# Patient Record
Sex: FEMALE | Race: WHITE | NOT HISPANIC OR LATINO | Employment: PART TIME | URBAN - METROPOLITAN AREA
[De-identification: names, ages, dates, MRNs, and addresses within clinical notes are randomized per-mention and may not be internally consistent; named-entity substitution may affect disease eponyms.]

---

## 2017-02-15 ENCOUNTER — TRANSFERRED RECORDS (OUTPATIENT)
Dept: HEALTH INFORMATION MANAGEMENT | Facility: CLINIC | Age: 57
End: 2017-02-15

## 2017-02-20 ENCOUNTER — COMMUNICATION - HEALTHEAST (OUTPATIENT)
Dept: PALLIATIVE MEDICINE | Facility: OTHER | Age: 57
End: 2017-02-20

## 2017-02-21 ENCOUNTER — HOSPITAL ENCOUNTER (OUTPATIENT)
Dept: PALLIATIVE MEDICINE | Facility: OTHER | Age: 57
Discharge: HOME | End: 2017-02-21
Attending: PAIN MEDICINE

## 2017-02-21 DIAGNOSIS — M62.838 MUSCLE SPASM: ICD-10-CM

## 2017-02-21 DIAGNOSIS — G43.909 MIGRAINES: ICD-10-CM

## 2017-02-21 ASSESSMENT — MIFFLIN-ST. JEOR: SCORE: 1038.09

## 2017-02-22 ENCOUNTER — COMMUNICATION - HEALTHEAST (OUTPATIENT)
Dept: PALLIATIVE MEDICINE | Facility: OTHER | Age: 57
End: 2017-02-22

## 2017-02-22 DIAGNOSIS — R07.9 CHEST PAIN, UNSPECIFIED TYPE: Primary | ICD-10-CM

## 2017-03-23 ENCOUNTER — HOSPITAL ENCOUNTER (OUTPATIENT)
Dept: CARDIOLOGY | Facility: CLINIC | Age: 57
Discharge: HOME | End: 2017-03-23
Attending: FAMILY MEDICINE | Admitting: FAMILY MEDICINE
Payer: COMMERCIAL

## 2017-03-23 DIAGNOSIS — R07.9 CHEST PAIN, UNSPECIFIED TYPE: ICD-10-CM

## 2017-03-23 PROCEDURE — 93350 STRESS TTE ONLY: CPT | Mod: 26 | Performed by: INTERNAL MEDICINE

## 2017-03-23 PROCEDURE — 93016 CV STRESS TEST SUPVJ ONLY: CPT | Performed by: INTERNAL MEDICINE

## 2017-03-23 PROCEDURE — 40000264 ECHO STRESS WITH OPTISON

## 2017-03-23 PROCEDURE — 93325 DOPPLER ECHO COLOR FLOW MAPG: CPT | Mod: 26 | Performed by: INTERNAL MEDICINE

## 2017-03-23 PROCEDURE — 93321 DOPPLER ECHO F-UP/LMTD STD: CPT | Mod: 26 | Performed by: INTERNAL MEDICINE

## 2017-03-23 PROCEDURE — 93018 CV STRESS TEST I&R ONLY: CPT | Performed by: INTERNAL MEDICINE

## 2017-03-23 PROCEDURE — 25500064 ZZH RX 255 OP 636: Performed by: FAMILY MEDICINE

## 2017-03-23 RX ADMIN — HUMAN ALBUMIN MICROSPHERES AND PERFLUTREN 2 ML: 10; .22 INJECTION, SOLUTION INTRAVENOUS at 09:13

## 2017-05-22 ENCOUNTER — COMMUNICATION - HEALTHEAST (OUTPATIENT)
Dept: PALLIATIVE MEDICINE | Facility: OTHER | Age: 57
End: 2017-05-22

## 2017-05-23 ENCOUNTER — HOSPITAL ENCOUNTER (OUTPATIENT)
Dept: PALLIATIVE MEDICINE | Facility: OTHER | Age: 57
Discharge: HOME | End: 2017-05-23
Attending: PAIN MEDICINE

## 2017-05-23 DIAGNOSIS — M79.18 MYOFASCIAL PAIN: ICD-10-CM

## 2017-05-24 ENCOUNTER — COMMUNICATION - HEALTHEAST (OUTPATIENT)
Dept: PALLIATIVE MEDICINE | Facility: OTHER | Age: 57
End: 2017-05-24

## 2017-09-07 ENCOUNTER — HOSPITAL ENCOUNTER (OUTPATIENT)
Dept: MAMMOGRAPHY | Facility: HOSPITAL | Age: 57
Discharge: HOME | End: 2017-09-07
Attending: FAMILY MEDICINE

## 2017-09-07 DIAGNOSIS — Z12.31 VISIT FOR SCREENING MAMMOGRAM: ICD-10-CM

## 2017-09-25 ENCOUNTER — COMMUNICATION - HEALTHEAST (OUTPATIENT)
Dept: PALLIATIVE MEDICINE | Facility: OTHER | Age: 57
End: 2017-09-25

## 2017-09-26 ENCOUNTER — HOSPITAL ENCOUNTER (OUTPATIENT)
Dept: PALLIATIVE MEDICINE | Facility: OTHER | Age: 57
Discharge: HOME | End: 2017-09-26
Attending: PAIN MEDICINE

## 2017-09-26 DIAGNOSIS — M79.18 MYOFASCIAL PAIN: ICD-10-CM

## 2017-09-26 ASSESSMENT — MIFFLIN-ST. JEOR: SCORE: 1038.09

## 2017-09-27 ENCOUNTER — COMMUNICATION - HEALTHEAST (OUTPATIENT)
Dept: PALLIATIVE MEDICINE | Facility: OTHER | Age: 57
End: 2017-09-27

## 2017-11-26 ENCOUNTER — HEALTH MAINTENANCE LETTER (OUTPATIENT)
Age: 57
End: 2017-11-26

## 2018-04-24 ENCOUNTER — COMMUNICATION - HEALTHEAST (OUTPATIENT)
Dept: PALLIATIVE MEDICINE | Facility: OTHER | Age: 58
End: 2018-04-24

## 2018-04-25 ENCOUNTER — HOSPITAL ENCOUNTER (OUTPATIENT)
Dept: PALLIATIVE MEDICINE | Facility: OTHER | Age: 58
Discharge: HOME | End: 2018-04-25
Attending: PAIN MEDICINE | Admitting: PAIN MEDICINE

## 2018-04-25 DIAGNOSIS — G43.711 HEADACHE, CHRONIC MIGRAINE WITHOUT AURA, INTRACTABLE, WITH STATUS: ICD-10-CM

## 2018-04-25 ASSESSMENT — MIFFLIN-ST. JEOR: SCORE: 1056.23

## 2018-04-26 ENCOUNTER — COMMUNICATION - HEALTHEAST (OUTPATIENT)
Dept: PALLIATIVE MEDICINE | Facility: OTHER | Age: 58
End: 2018-04-26

## 2018-04-28 ENCOUNTER — HOSPITAL ENCOUNTER (EMERGENCY)
Facility: CLINIC | Age: 58
Discharge: HOME | End: 2018-04-28
Attending: FAMILY MEDICINE | Admitting: FAMILY MEDICINE
Payer: COMMERCIAL

## 2018-04-28 VITALS
WEIGHT: 114 LBS | HEART RATE: 82 BPM | BODY MASS INDEX: 20.2 KG/M2 | TEMPERATURE: 99.4 F | HEIGHT: 63 IN | OXYGEN SATURATION: 98 % | DIASTOLIC BLOOD PRESSURE: 66 MMHG | SYSTOLIC BLOOD PRESSURE: 102 MMHG | RESPIRATION RATE: 18 BRPM

## 2018-04-28 DIAGNOSIS — G43.809 OTHER MIGRAINE WITHOUT STATUS MIGRAINOSUS, NOT INTRACTABLE: ICD-10-CM

## 2018-04-28 PROCEDURE — 96365 THER/PROPH/DIAG IV INF INIT: CPT | Performed by: FAMILY MEDICINE

## 2018-04-28 PROCEDURE — 96375 TX/PRO/DX INJ NEW DRUG ADDON: CPT | Performed by: FAMILY MEDICINE

## 2018-04-28 PROCEDURE — 99284 EMERGENCY DEPT VISIT MOD MDM: CPT | Mod: Z6 | Performed by: FAMILY MEDICINE

## 2018-04-28 PROCEDURE — 99284 EMERGENCY DEPT VISIT MOD MDM: CPT | Mod: 25 | Performed by: FAMILY MEDICINE

## 2018-04-28 PROCEDURE — 25000128 H RX IP 250 OP 636: Performed by: FAMILY MEDICINE

## 2018-04-28 PROCEDURE — 96361 HYDRATE IV INFUSION ADD-ON: CPT | Performed by: FAMILY MEDICINE

## 2018-04-28 RX ORDER — KETOROLAC TROMETHAMINE 30 MG/ML
15 INJECTION, SOLUTION INTRAMUSCULAR; INTRAVENOUS ONCE
Status: COMPLETED | OUTPATIENT
Start: 2018-04-28 | End: 2018-04-28

## 2018-04-28 RX ORDER — ONDANSETRON 2 MG/ML
4 INJECTION INTRAMUSCULAR; INTRAVENOUS EVERY 30 MIN PRN
Status: DISCONTINUED | OUTPATIENT
Start: 2018-04-28 | End: 2018-04-28 | Stop reason: HOSPADM

## 2018-04-28 RX ORDER — DIPHENHYDRAMINE HYDROCHLORIDE 50 MG/ML
50 INJECTION INTRAMUSCULAR; INTRAVENOUS ONCE
Status: COMPLETED | OUTPATIENT
Start: 2018-04-28 | End: 2018-04-28

## 2018-04-28 RX ORDER — METOCLOPRAMIDE 5 MG/1
5-10 TABLET ORAL
Qty: 10 TABLET | Refills: 0 | Status: SHIPPED | OUTPATIENT
Start: 2018-04-28

## 2018-04-28 RX ORDER — SODIUM CHLORIDE 9 MG/ML
1000 INJECTION, SOLUTION INTRAVENOUS CONTINUOUS
Status: DISCONTINUED | OUTPATIENT
Start: 2018-04-28 | End: 2018-04-28 | Stop reason: HOSPADM

## 2018-04-28 RX ORDER — MAGNESIUM SULFATE HEPTAHYDRATE 500 MG/ML
1 INJECTION, SOLUTION INTRAMUSCULAR; INTRAVENOUS ONCE
Status: DISCONTINUED | OUTPATIENT
Start: 2018-04-28 | End: 2018-04-28

## 2018-04-28 RX ORDER — MAGNESIUM SULFATE 1 G/100ML
1 INJECTION INTRAVENOUS ONCE
Status: COMPLETED | OUTPATIENT
Start: 2018-04-28 | End: 2018-04-28

## 2018-04-28 RX ADMIN — ONDANSETRON 4 MG: 2 INJECTION, SOLUTION INTRAMUSCULAR; INTRAVENOUS at 14:53

## 2018-04-28 RX ADMIN — PROCHLORPERAZINE EDISYLATE 10 MG: 5 INJECTION INTRAMUSCULAR; INTRAVENOUS at 15:16

## 2018-04-28 RX ADMIN — KETOROLAC TROMETHAMINE 15 MG: 30 INJECTION, SOLUTION INTRAMUSCULAR at 16:31

## 2018-04-28 RX ADMIN — DIPHENHYDRAMINE HYDROCHLORIDE 50 MG: 50 INJECTION, SOLUTION INTRAMUSCULAR; INTRAVENOUS at 15:14

## 2018-04-28 RX ADMIN — SODIUM CHLORIDE 1000 ML: 9 INJECTION, SOLUTION INTRAVENOUS at 15:58

## 2018-04-28 RX ADMIN — SODIUM CHLORIDE 1000 ML: 9 INJECTION, SOLUTION INTRAVENOUS at 14:46

## 2018-04-28 RX ADMIN — MAGNESIUM SULFATE IN DEXTROSE 1 G: 10 INJECTION, SOLUTION INTRAVENOUS at 16:37

## 2018-04-28 ASSESSMENT — ENCOUNTER SYMPTOMS
ABDOMINAL PAIN: 0
SINUS PRESSURE: 0
HEADACHES: 1
SORE THROAT: 0
WHEEZING: 0
PALPITATIONS: 0
PHOTOPHOBIA: 1
COUGH: 0
NAUSEA: 1
BLOOD IN STOOL: 0
FEVER: 0
VOMITING: 1
CONSTIPATION: 0
DIARRHEA: 0
SHORTNESS OF BREATH: 0
DIAPHORESIS: 0
CHILLS: 0
DYSURIA: 0
FREQUENCY: 0

## 2018-04-28 NOTE — DISCHARGE INSTRUCTIONS
ICD-10-CM    1. Other migraine without status migrainosus, not intractable G43.809     resume home medications.  Avoid migraine triggers.  Follow-up clinic. consider migraine prophylaxis such as amitriptyline, topomax, propranolol,.  consider reglan with ibuprofen instead of fioricet.   reglan can cause dystonic reactions - if this was to occur, take benadryl         * Migraine Headache  Migraine headaches are related to changes in blood flow to the brain. This causes throbbing or constant pain on one or both sides of the head. The pain may last from a few hours to several days. There is usually nausea, vomiting, sensitivity to light and sound, and blurred vision. A migraine attack may be triggered by emotional stress, hormone changes during the menstrual cycle, oral contraceptives, alcohol use, certain foods containing tyramine, eye strain, weather changes, missing meals, or too little or too much sleep.  Home Care For This Headache:  1) If you were given pain medicine for this headache, do not drive yourself home . Arrange for a ride, instead. When you get home, try to sleep. You should feel much better when you wake up.  2) Migraine headaches may improve with an ice pack on the forehead or at the base of the skull. Heat to the back of your neck may relieve any neck spasm.  3) Drink only clear liquids or eat a very light diet to avoid nausea/vomiting until symptoms improve.  Preventing Future Headaches:  1) Pay attention to those factors that seem to trigger your headache. Try to avoid them when you can. If you have frequent headaches, it is useful to keep a diary of what you were doing, feeling or eating in the hours before each attack. Show this to your doctor to help find the cause of your headaches.  a) If you feel that stress is a factor in your headaches, look at the sources of stress in your life. Find ways to release the build-up of those stresses by using regular exercise, relaxation methods (yoga,  meditation), bio-feedback or simply taking time-out for yourself. For more information about this, consult your doctor or go to a local bookstore and review books and tapes on this subject.  b) Tyramine is a substance present in the following foods : chocolate, yogurt, all cheeses except cottage cheese and cream cheese. smoked or pickled fish and meat (including herring, caviar, bologna, pepperoni, salami), liver, avocados, bananas, figs, raisins, and red wine. Be aware that these foods may trigger a migraine in some persons. Try taking these foods out of your diet for 1-2 months to see if this reduces headache frequency.  Treating Future Attacks:  1) At the first sign of a migraine headache, take a medicine to stop it if one has been prescribed for you. If not, take acetaminophen (Tylenol) or ibuprofen (Motrin, Advil) if you are able to take these. The sooner you take medicine, the better it will work.  2) You may also want to find a quiet, dark, comfortable place to sit or lie down. Let yourself relax or sleep.  3) An ice pack on the forehead or area of greatest pain may also help.   Follow Up  with your doctor if the headache is not better within the next 24 hours. If you have frequent headaches you should discuss a treatment plan with your primary care doctor. Ask if you can have medicine to take at home the next time you get a bad headache. Poorly controlled chronic headaches may require a referral to a neurologist (headache specialist).  Get Prompt Medical Attention  if any of the following occur:    Your head pain gets worse, or does not improve within 24 hours    Repeated vomiting (can t keep liquids down)    Sinus or ear or throat pain (not already reported)    Fever of 101  F (38.3  C) or higher, or as directed by your healthcare provider    Stiff neck    Extreme drowsiness, confusion or fainting    Weakness of an arm or leg or one side of the face    Difficulty with speech or vision    2627-2067 [employer]. [address] [place] [address], [place] [address]. All rights reserved. This information is not intended as a substitute for professional medical care. Always follow your healthcare professional's instructions.  This information has been modified by your health care provider with permission from the publisher.

## 2018-04-28 NOTE — ED AVS SNAPSHOT
[facility] Emergency Department    [address] [facility] [address]    Phone:  [phone]    Fax:  [phone]                                       Jessika Hunt   MRN: [id number]    Department:  [facility] Emergency Department   Date of Visit:  4/28/2018           Patient Information     Date Of Birth          1960        Your diagnoses for this visit were:     Other migraine without status migrainosus, not intractable resume home medications.  Avoid migraine triggers.  Follow-up clinic. consider migraine prophylaxis such as amitriptyline, topomax, propranolol,.  consider reglan with ibuprofen instead of fioricet.   reglan can cause dystonic reactions - if this was to occur, take benadryl       You were seen by Kit Washington MD.      Follow-up Information     Follow up with Melody Rubalcava MD In 1 week.    Specialty:  Family Practice    Contact information:    [address] [address]  [phone]          Follow up with [facility] Emergency Department.    Specialty:  EMERGENCY MEDICINE    Why:  As needed, If symptoms worsen    Contact information:    [address] [place] [id number] [phone]    Additional information:    The medical center is located at   [address] [place] [address]. (between [address] and   [address] in [place], [place] north   of [place]).        Discharge Instructions         ICD-10-CM    1. Other migraine without status migrainosus, not intractable G43.809     resume home medications.  Avoid migraine triggers.  Follow-up clinic. consider migraine prophylaxis such as amitriptyline, topomax, propranolol,.  consider reglan with ibuprofen instead of fioricet.   reglan can cause dystonic reactions - if this was to occur, take benadryl         * Migraine Headache  Migraine headaches are related to changes in blood flow to the brain. This causes throbbing or constant pain on one or both sides of the head.  The pain may last from a few hours to several days. There is usually nausea, vomiting, sensitivity to light and sound, and blurred vision. A migraine attack may be triggered by emotional stress, hormone changes during the menstrual cycle, oral contraceptives, alcohol use, certain foods containing tyramine, eye strain, weather changes, missing meals, or too little or too much sleep.  Home Care For This Headache:  1) If you were given pain medicine for this headache, do not drive yourself home . Arrange for a ride, instead. When you get home, try to sleep. You should feel much better when you wake up.  2) Migraine headaches may improve with an ice pack on the forehead or at the base of the skull. Heat to the back of your neck may relieve any neck spasm.  3) Drink only clear liquids or eat a very light diet to avoid nausea/vomiting until symptoms improve.  Preventing Future Headaches:  1) Pay attention to those factors that seem to trigger your headache. Try to avoid them when you can. If you have frequent headaches, it is useful to keep a diary of what you were doing, feeling or eating in the hours before each attack. Show this to your doctor to help find the cause of your headaches.  a) If you feel that stress is a factor in your headaches, look at the sources of stress in your life. Find ways to release the build-up of those stresses by using regular exercise, relaxation methods (yoga, meditation), bio-feedback or simply taking time-out for yourself. For more information about this, consult your doctor or go to a local bookstore and review books and tapes on this subject.  b) Tyramine is a substance present in the following foods : chocolate, yogurt, all cheeses except cottage cheese and cream cheese. smoked or pickled fish and meat (including herring, caviar, bologna, pepperoni, salami), liver, avocados, bananas, figs, raisins, and red wine. Be aware that these foods may trigger a migraine in some persons. Try  taking these foods out of your diet for 1-2 months to see if this reduces headache frequency.  Treating Future Attacks:  1) At the first sign of a migraine headache, take a medicine to stop it if one has been prescribed for you. If not, take acetaminophen (Tylenol) or ibuprofen (Motrin, Advil) if you are able to take these. The sooner you take medicine, the better it will work.  2) You may also want to find a quiet, dark, comfortable place to sit or lie down. Let yourself relax or sleep.  3) An ice pack on the forehead or area of greatest pain may also help.   Follow Up  with your doctor if the headache is not better within the next 24 hours. If you have frequent headaches you should discuss a treatment plan with your primary care doctor. Ask if you can have medicine to take at home the next time you get a bad headache. Poorly controlled chronic headaches may require a referral to a neurologist (headache specialist).  Get Prompt Medical Attention  if any of the following occur:    Your head pain gets worse, or does not improve within 24 hours    Repeated vomiting (can t keep liquids down)    Sinus or ear or throat pain (not already reported)    Fever of 101  F (38.3  C) or higher, or as directed by your healthcare provider    Stiff neck    Extreme drowsiness, confusion or fainting    Weakness of an arm or leg or one side of the face    Difficulty with speech or vision    2568-4775 [employer]. [address] [place] [address]. All rights reserved. This information is not intended as a substitute for professional medical care. Always follow your healthcare professional's instructions.  This information has been modified by your health care provider with permission from the publisher.          24 Hour Appointment Hotline       To make an appointment at any [facility] clinic, call [phone] ([phone]). If you don't have a family doctor or clinic, we will help you find one. [facility]  clin[facility] are conveniently located to serve the needs of you and your family.             Review of your medicines      START taking        Dose / Directions Last dose taken    metoclopramide 5 MG tablet   Commonly known as:  REGLAN   Dose:  5-10 mg   Quantity:  10 tablet        Take 1-2 tablets (5-10 mg) by mouth once as needed (migraine - taken with excedrin or ibuprofen)   Refills:  0          Our records show that you are taking the medicines listed below. If these are incorrect, please call your family doctor or clinic.        Dose / Directions Last dose taken    BUTALBITAL-APAP-CAFF-COD PO   Dose:  1 tablet        Take 1 tablet by mouth at onset of headache   Refills:  0        IBUPROFEN PO   Dose:  800 mg        Take 800 mg by mouth at onset of headache   Refills:  0        PROZAC PO   Dose:  20 mg        Take 20 mg by mouth daily   Refills:  0        RELPAX PO   Dose:  40 mg        Take 40 mg by mouth at onset of headache   Refills:  0                Prescriptions were sent or printed at these locations (1 Prescription)                   [facility] [address] [place] [address] AT [address]   [address] [facility] [address]    Telephone:  [phone]   Fax:  [phone]   Hours:                  E-Prescribed (1 of 1)         metoclopramide (REGLAN) 5 MG tablet                Orders Needing Specimen Collection     None      Pending Results     No orders found from 4/26/2018 to 4/29/2018.            Pending Culture Results     No orders found from 4/26/2018 to 4/29/2018.            Pending Results Instructions     If you had any lab results that were not finalized at the time of your Discharge, you can call the ED Lab Result RN at [phone]. You will be contacted by this team for any positive Lab results or changes in treatment. The nurses are available 7 days a week from 10A to 6:30P.  You can leave a message 24 hours per day and they will return your  call.        Test Results From Your Hospital Stay               Thank you for choosing [facility]       Thank you for choosing [facility] for your care. Our goal is always to provide you with excellent care. Hearing back from our patients is one way we can continue to improve our services. Please take a few minutes to complete the written survey that you may receive in the mail after you visit with us. Thank you!        [employer] Information     [employer] gives you secure access to your electronic health record. If you see a primary care provider, you can also send messages to your care team and make appointments. If you have questions, please call your primary care clinic.  If you do not have a primary care provider, please call [phone] and they will assist you.        Care EveryWhere ID     This is your Care EveryWhere ID. This could be used by other organizations to access your [facility] medical records  [id number]        Equal Access to Services     BETZY GOLD : Wilfredo Edouard, gisela carmen, montse mcdowell. So [facility] [phone].    ATENCIÓN: Si habla español, tiene a stallings disposición servicios gratuitos de asistencia lingüística. Llame al [phone].    We comply with applicable federal civil rights laws and [place] laws. We do not discriminate on the basis of race, color, national origin, age, disability, sex, sexual orientation, or gender identity.            After Visit Summary       This is your record. Keep this with you and show to your community pharmacist(s) and doctor(s) at your next visit.

## 2018-04-28 NOTE — ED PROVIDER NOTES
History     Chief Complaint   Patient presents with     Headache     migraine     HPI  Jessika Hunt is a 58 year old female who presented with migraine with aura - onset 1 am =- fell asleep awoke throbbing headache and both occipital and frontal.  non-thunderclap.  no fever.  no head injury.  No seizure history. light sensitivity,. nausea, vomiting.   imitrex or relpax - abortive migraine meds.  No migraine prophylax.  at least 2 migraines per month.   botox injections last wednesday in pain clinic  uses fioricet occl  nasal congestion.      took ibuprofen 800 mg at noon  PMH  Migraines    occl etoh  no tobacco    menopausal     Problem List:    There are no active problems to display for this patient.       Past Medical History:    No past medical history on file.    Past Surgical History:    No past surgical history on file.    Family History:    No family history on file.    Social History:  Marital Status:   [2]  Social History   Substance Use Topics     Smoking status: Never Smoker     Smokeless tobacco: Never Used     Alcohol use Yes        Medications:      BUTALBITAL-APAP-CAFF-COD PO   Eletriptan Hydrobromide (RELPAX PO)   IBUPROFEN PO         Review of Systems   Constitutional: Negative for chills, diaphoresis and fever.   HENT: Positive for congestion. Negative for ear pain, sinus pressure and sore throat.    Eyes: Positive for photophobia. Negative for visual disturbance.   Respiratory: Negative for cough, shortness of breath and wheezing.    Cardiovascular: Negative for chest pain and palpitations.   Gastrointestinal: Positive for nausea and vomiting. Negative for abdominal pain, blood in stool, constipation and diarrhea.   Genitourinary: Negative for dysuria, frequency and urgency.   Skin: Negative for rash.   Neurological: Positive for headaches.   All other systems reviewed and are negative.      Physical Exam   BP: 107/68  Pulse: 82  Temp: 99.4  F (37.4  C)  Resp: 18  Height: 160 cm (5'  "3\")  Weight: 51.7 kg (114 lb)  SpO2: 99 %      Physical Exam   Constitutional: She is oriented to person, place, and time. She appears distressed.   HENT:   Mouth/Throat: Oropharynx is clear and moist.   Eyes: Conjunctivae and EOM are normal. Pupils are equal, round, and reactive to light.   Neck: Neck supple.   Cardiovascular: Exam reveals no gallop and no friction rub.    No murmur heard.  Pulmonary/Chest: Effort normal and breath sounds normal. No respiratory distress. She has no wheezes. She has no rales.   Abdominal: Soft. Bowel sounds are normal. She exhibits no distension. There is no tenderness. There is no rebound.   Musculoskeletal: She exhibits no edema.   Neurological: She is alert and oriented to person, place, and time. No cranial nerve deficit. She exhibits normal muscle tone. Coordination normal.   Skin: No rash noted. She is not diaphoretic.       ED Course     ED Course     Procedures               Critical Care time:  none                   Medications   0.9% sodium chloride BOLUS (0 mLs Intravenous Stopped 4/28/18 1557)   diphenhydrAMINE (BENADRYL) injection 50 mg (50 mg Intravenous Given 4/28/18 1514)   prochlorperazine (COMPAZINE) injection 10 mg (10 mg Intravenous Given 4/28/18 1516)   ketorolac (TORADOL) injection 15 mg (15 mg Intravenous Given 4/28/18 1631)   magnesium sulfate 1 gm in 100mL D5W intermittent infusion (0 g Intravenous Stopped 4/28/18 1731)       Assessments & Plan (with Medical Decision Making)     MDM: Jessika Hunt is a 58 year old female who presented with migraine history and without red flag symptoms.  This headache came on at 1 AM.  And had persisted since.  She had a normal neurologic exam.  Moderate distress.  Lasix symptoms for her migraines.  She was given management as above initially with Benadryl Compazine and fluids and then when had incomplete relief was given Toradol as of now been 4-5 hours since the last ibuprofen dose and she also been given magnesium " sulfate.  Marked improvement.  Discharged home with precautions for return.  We also discussed migraine prophylaxis as noted below and asked her to discuss this further with her primary provider Dr. Rubalcava.    I have reviewed the nursing notes.    I have reviewed the findings, diagnosis, plan and need for follow up with the patient.       New Prescriptions    No medications on file       Final diagnoses:   Other migraine without status migrainosus, not intractable - resume home medications.  Avoid migraine triggers.  Follow-up clinic. consider migraine prophylaxis such as amitriptyline, topomax, propranolol,.  consider reglan with ibuprofen instead of fioricet.   reglan can cause dystonic reactions - if this was to occur, take benadryl       4/28/2018   [facility] EMERGENCY DEPARTMENT     Kit Washington MD  04/28/18 2014

## 2018-04-28 NOTE — ED NOTES
Hx of migraines.   Pt started having migraine 3074-1504 today.  Pt reports taking ibuprofen, benadryl, sudafed and relpax but unable to keep medications down.   Pt vomited 30 minutes ago.   Migraine throughout forehead,eyes and radiates bilateral neck to base of posterior neck.

## 2018-04-28 NOTE — ED AVS SNAPSHOT
[facility] Emergency Department    [address] [facility] [address]    Phone:  [phone]    Fax:  [phone]                                       Jessika Hunt   MRN: [id number]    Department:  [facility] Emergency Department   Date of Visit:  4/28/2018           After Visit Summary Signature Page     I have received my discharge instructions, and my questions have been answered. I have discussed any challenges I see with this plan with the nurse or doctor.    ..........................................................................................................................................  Patient/Patient Representative Signature      ..........................................................................................................................................  Patient Representative Print Name and Relationship to Patient    ..................................................               ................................................  Date                                            Time    ..........................................................................................................................................  Reviewed by Signature/Title    ...................................................              ..............................................  Date                                                            Time

## 2018-06-13 ENCOUNTER — HOSPITAL ENCOUNTER (OUTPATIENT)
Dept: PALLIATIVE MEDICINE | Facility: OTHER | Age: 58
Discharge: HOME | End: 2018-06-13
Attending: PAIN MEDICINE | Admitting: PAIN MEDICINE

## 2018-06-13 DIAGNOSIS — M79.18 MYOFASCIAL PAIN: ICD-10-CM

## 2018-06-14 ENCOUNTER — COMMUNICATION - HEALTHEAST (OUTPATIENT)
Dept: PALLIATIVE MEDICINE | Facility: OTHER | Age: 58
End: 2018-06-14

## 2018-10-30 ENCOUNTER — COMMUNICATION - HEALTHEAST (OUTPATIENT)
Dept: PALLIATIVE MEDICINE | Facility: OTHER | Age: 58
End: 2018-10-30

## 2018-10-31 ENCOUNTER — RECORDS - HEALTHEAST (OUTPATIENT)
Dept: ADMINISTRATIVE | Facility: OTHER | Age: 58
End: 2018-10-31

## 2018-10-31 ENCOUNTER — HOSPITAL ENCOUNTER (OUTPATIENT)
Dept: PALLIATIVE MEDICINE | Facility: OTHER | Age: 58
Discharge: HOME | End: 2018-10-31
Attending: PAIN MEDICINE | Admitting: PAIN MEDICINE

## 2018-10-31 DIAGNOSIS — G43.909 MIGRAINE: ICD-10-CM

## 2018-10-31 DIAGNOSIS — G43.019 INTRACTABLE MIGRAINE WITHOUT AURA AND WITHOUT STATUS MIGRAINOSUS: ICD-10-CM

## 2018-10-31 ASSESSMENT — MIFFLIN-ST. JEOR: SCORE: 1056.23

## 2018-11-01 ENCOUNTER — COMMUNICATION - HEALTHEAST (OUTPATIENT)
Dept: PALLIATIVE MEDICINE | Facility: OTHER | Age: 58
End: 2018-11-01

## 2019-06-04 ENCOUNTER — COMMUNICATION - HEALTHEAST (OUTPATIENT)
Dept: PALLIATIVE MEDICINE | Facility: OTHER | Age: 59
End: 2019-06-04

## 2019-06-05 ENCOUNTER — HOSPITAL ENCOUNTER (OUTPATIENT)
Dept: PALLIATIVE MEDICINE | Facility: OTHER | Age: 59
Discharge: HOME | End: 2019-06-05
Attending: PAIN MEDICINE | Admitting: PAIN MEDICINE

## 2019-06-05 DIAGNOSIS — M79.18 MYOFASCIAL PAIN: ICD-10-CM

## 2019-06-05 ASSESSMENT — MIFFLIN-ST. JEOR: SCORE: 1056.23

## 2019-06-06 ENCOUNTER — COMMUNICATION - HEALTHEAST (OUTPATIENT)
Dept: PALLIATIVE MEDICINE | Facility: OTHER | Age: 59
End: 2019-06-06

## 2019-07-22 ENCOUNTER — RECORDS - HEALTHEAST (OUTPATIENT)
Dept: LAB | Facility: CLINIC | Age: 59
End: 2019-07-22

## 2019-07-22 LAB
ALBUMIN SERPL-MCNC: 3.9 G/DL (ref 3.5–5)
ALP SERPL-CCNC: 82 U/L (ref 45–120)
ALT SERPL W P-5'-P-CCNC: 14 U/L (ref 0–45)
ANION GAP SERPL CALCULATED.3IONS-SCNC: 9 MMOL/L (ref 5–18)
AST SERPL W P-5'-P-CCNC: 18 U/L (ref 0–40)
BILIRUB SERPL-MCNC: 0.2 MG/DL (ref 0–1)
BUN SERPL-MCNC: 17 MG/DL (ref 8–22)
CALCIUM SERPL-MCNC: 9.6 MG/DL (ref 8.5–10.5)
CHLORIDE BLD-SCNC: 104 MMOL/L (ref 98–107)
CHOLEST SERPL-MCNC: 242 MG/DL
CO2 SERPL-SCNC: 27 MMOL/L (ref 22–31)
CREAT SERPL-MCNC: 0.85 MG/DL (ref 0.6–1.1)
FASTING STATUS PATIENT QL REPORTED: ABNORMAL
GFR SERPL CREATININE-BSD FRML MDRD: >60 ML/MIN/1.73M2
GLUCOSE BLD-MCNC: 97 MG/DL (ref 70–125)
HDLC SERPL-MCNC: 74 MG/DL
LDLC SERPL CALC-MCNC: 139 MG/DL
POTASSIUM BLD-SCNC: 4.3 MMOL/L (ref 3.5–5)
PROT SERPL-MCNC: 6.7 G/DL (ref 6–8)
SODIUM SERPL-SCNC: 140 MMOL/L (ref 136–145)
TRIGL SERPL-MCNC: 146 MG/DL
TSH SERPL DL<=0.005 MIU/L-ACNC: 0.7 UIU/ML (ref 0.3–5)

## 2019-09-23 ENCOUNTER — RECORDS - HEALTHEAST (OUTPATIENT)
Dept: ADMINISTRATIVE | Facility: OTHER | Age: 59
End: 2019-09-23

## 2019-09-24 ENCOUNTER — RECORDS - HEALTHEAST (OUTPATIENT)
Dept: LAB | Facility: CLINIC | Age: 59
End: 2019-09-24

## 2019-09-25 LAB — 25(OH)D3 SERPL-MCNC: 56 NG/ML (ref 30–80)

## 2019-10-14 ENCOUNTER — HOSPITAL ENCOUNTER (OUTPATIENT)
Dept: MAMMOGRAPHY | Facility: CLINIC | Age: 59
Discharge: HOME | End: 2019-10-14
Attending: FAMILY MEDICINE

## 2019-10-14 DIAGNOSIS — Z12.31 VISIT FOR SCREENING MAMMOGRAM: ICD-10-CM

## 2019-11-12 ENCOUNTER — COMMUNICATION - HEALTHEAST (OUTPATIENT)
Dept: PALLIATIVE MEDICINE | Facility: OTHER | Age: 59
End: 2019-11-12

## 2019-11-13 ENCOUNTER — HOSPITAL ENCOUNTER (OUTPATIENT)
Dept: PALLIATIVE MEDICINE | Facility: OTHER | Age: 59
Discharge: HOME | End: 2019-11-13
Attending: PAIN MEDICINE | Admitting: PAIN MEDICINE

## 2019-11-13 DIAGNOSIS — G43.719 INTRACTABLE CHRONIC MIGRAINE WITHOUT AURA AND WITHOUT STATUS MIGRAINOSUS: ICD-10-CM

## 2019-11-13 ASSESSMENT — MIFFLIN-ST. JEOR: SCORE: 1056.23

## 2019-11-14 ENCOUNTER — COMMUNICATION - HEALTHEAST (OUTPATIENT)
Dept: PALLIATIVE MEDICINE | Facility: OTHER | Age: 59
End: 2019-11-14

## 2020-03-02 ENCOUNTER — HEALTH MAINTENANCE LETTER (OUTPATIENT)
Age: 60
End: 2020-03-02

## 2020-05-29 ENCOUNTER — RECORDS - HEALTHEAST (OUTPATIENT)
Dept: ADMINISTRATIVE | Facility: OTHER | Age: 60
End: 2020-05-29

## 2020-08-18 ENCOUNTER — RECORDS - HEALTHEAST (OUTPATIENT)
Dept: ADMINISTRATIVE | Facility: OTHER | Age: 60
End: 2020-08-18

## 2020-09-10 ENCOUNTER — RECORDS - HEALTHEAST (OUTPATIENT)
Dept: ADMINISTRATIVE | Facility: OTHER | Age: 60
End: 2020-09-10

## 2020-12-02 ENCOUNTER — RECORDS - HEALTHEAST (OUTPATIENT)
Dept: ADMINISTRATIVE | Facility: OTHER | Age: 60
End: 2020-12-02

## 2020-12-14 ENCOUNTER — HEALTH MAINTENANCE LETTER (OUTPATIENT)
Age: 60
End: 2020-12-14

## 2021-01-20 ENCOUNTER — HOSPITAL ENCOUNTER (OUTPATIENT)
Dept: MAMMOGRAPHY | Facility: CLINIC | Age: 61
Discharge: HOME | End: 2021-01-20
Attending: FAMILY MEDICINE

## 2021-01-20 DIAGNOSIS — Z12.31 VISIT FOR SCREENING MAMMOGRAM: ICD-10-CM

## 2021-01-21 ENCOUNTER — RECORDS - HEALTHEAST (OUTPATIENT)
Dept: ADMINISTRATIVE | Facility: OTHER | Age: 61
End: 2021-01-21

## 2021-01-27 ENCOUNTER — AMBULATORY - HEALTHEAST (OUTPATIENT)
Dept: SURGERY | Facility: CLINIC | Age: 61
End: 2021-01-27

## 2021-01-27 ENCOUNTER — HOSPITAL ENCOUNTER (OUTPATIENT)
Dept: MAMMOGRAPHY | Facility: CLINIC | Age: 61
Discharge: HOME | End: 2021-01-27
Attending: FAMILY MEDICINE

## 2021-01-27 ENCOUNTER — RECORDS - HEALTHEAST (OUTPATIENT)
Dept: ADMINISTRATIVE | Facility: OTHER | Age: 61
End: 2021-01-27

## 2021-01-27 ENCOUNTER — AMBULATORY - HEALTHEAST (OUTPATIENT)
Dept: NURSING | Facility: CLINIC | Age: 61
End: 2021-01-27

## 2021-01-27 DIAGNOSIS — N64.89 BREAST ASYMMETRY: ICD-10-CM

## 2021-01-27 DIAGNOSIS — N63.10 BREAST MASS, RIGHT: ICD-10-CM

## 2021-01-28 ENCOUNTER — COMMUNICATION - HEALTHEAST (OUTPATIENT)
Dept: ONCOLOGY | Facility: HOSPITAL | Age: 61
End: 2021-01-28

## 2021-01-29 ENCOUNTER — COMMUNICATION - HEALTHEAST (OUTPATIENT)
Dept: ONCOLOGY | Facility: HOSPITAL | Age: 61
End: 2021-01-29

## 2021-02-03 ENCOUNTER — HOSPITAL ENCOUNTER (OUTPATIENT)
Dept: SURGERY | Facility: CLINIC | Age: 61
Discharge: HOME | End: 2021-02-03
Attending: SURGERY

## 2021-02-03 DIAGNOSIS — C50.911 INVASIVE DUCTAL CARCINOMA OF BREAST, RIGHT (H): ICD-10-CM

## 2021-02-03 ASSESSMENT — MIFFLIN-ST. JEOR: SCORE: 1042.62

## 2021-02-04 ENCOUNTER — COMMUNICATION - HEALTHEAST (OUTPATIENT)
Dept: ADMINISTRATIVE | Facility: HOSPITAL | Age: 61
End: 2021-02-04

## 2021-02-04 ENCOUNTER — COMMUNICATION - HEALTHEAST (OUTPATIENT)
Dept: ONCOLOGY | Facility: HOSPITAL | Age: 61
End: 2021-02-04

## 2021-02-05 ENCOUNTER — RECORDS - HEALTHEAST (OUTPATIENT)
Dept: ADMINISTRATIVE | Facility: OTHER | Age: 61
End: 2021-02-05

## 2021-02-08 ENCOUNTER — COMMUNICATION - HEALTHEAST (OUTPATIENT)
Dept: ONCOLOGY | Facility: HOSPITAL | Age: 61
End: 2021-02-08

## 2021-02-09 ENCOUNTER — COMMUNICATION - HEALTHEAST (OUTPATIENT)
Dept: ONCOLOGY | Facility: HOSPITAL | Age: 61
End: 2021-02-09

## 2021-02-09 ENCOUNTER — PATIENT OUTREACH (OUTPATIENT)
Dept: SURGERY | Facility: CLINIC | Age: 61
End: 2021-02-09

## 2021-02-09 ENCOUNTER — OFFICE VISIT - HEALTHEAST (OUTPATIENT)
Dept: RADIATION ONCOLOGY | Facility: HOSPITAL | Age: 61
End: 2021-02-09

## 2021-02-09 DIAGNOSIS — Z17.0 MALIGNANT NEOPLASM OF UPPER-OUTER QUADRANT OF RIGHT BREAST IN FEMALE, ESTROGEN RECEPTOR POSITIVE (H): ICD-10-CM

## 2021-02-09 DIAGNOSIS — C50.411 MALIGNANT NEOPLASM OF UPPER-OUTER QUADRANT OF RIGHT BREAST IN FEMALE, ESTROGEN RECEPTOR POSITIVE (H): ICD-10-CM

## 2021-02-09 DIAGNOSIS — C50.919 BREAST CANCER (H): Primary | ICD-10-CM

## 2021-02-09 NOTE — TELEPHONE ENCOUNTER
New Patient Oncology Nurse Navigator Note     Referring provider: Self referral     Referring Clinic/Organization: [facility]     Referred to: Surgical Oncology - Breast Surgery     Requested provider (if applicable): First available provider    Referral Received: 02/09/21       Evaluation for : Breast CA      Clinical History (per Nurse review of records provided):      Final Diagnosis BREAST, RIGHT, MASS, 9 O'CLOCK, 2 CM FROM NIPPLE, ULTRASOUND GUIDED NEEDLE CORE BIOPSY:    -  INVASIVE DUCTAL CARCINOMA, CHITO GRADE 1 (TUBULES 2, NUCLEI 2, MITOSES 1) WITH       A FOCAL LOBULAR GROWTH PATTERN    -  GREATEST TUMOR LENGTH, 8 MM    -  DCIS IS NOT IDENTIFIED    -  ER/AZ/HER2 IHC STAINS ARE PENDING AND RESULTS WILL BE REPORTED IN AN ADDENDUM     Addendum ER/AZ/HER2 IMMUNOHISTOCHEMICAL STAINS ARE PERFORMED WITH APPROPRIATE POSITIVE CONTROLS    ESTROGEN RECEPTOR: STRONGLY POSITIVE (> 95%; 3+), IN INVASIVE CARCINOMA  PROGESTERONE RECEPTOR: NEGATIVE (0%),  IN INVASIVE CARCINOMA  AOL9PTN: INDETERMINATE (SCORE 2+); PARAFFIN EMBEDDED TISSUE WILL BE SUBMITTED FOR REFLEX      HER-2/ROJAS ANALYSIS BY FISH (SEE SUPPLEMENTAL REPORT)     ADDENDUM COMMENT:  DR. YESI HOLT HAS REVIEWED THE  IMMUNOSTAINS AND CONCURS WITH INTERPRETATION    METHOD FOR EVALUATION:   MANUAL ESTIMATION           No past medical history on file.    No past surgical history on file.    Current Outpatient Medications   Medication Sig Dispense Refill     BUTALBITAL-APAP-CAFF-COD PO Take 1 tablet by mouth at onset of headache        Eletriptan Hydrobromide (RELPAX PO) Take 40 mg by mouth at onset of headache        FLUoxetine HCl (PROZAC PO) Take 20 mg by mouth daily       IBUPROFEN PO Take 800 mg by mouth at onset of headache        metoclopramide (REGLAN) 5 MG tablet Take 1-2 tablets (5-10 mg) by mouth once as needed (migraine - taken with excedrin or ibuprofen) 10 tablet 0           Allergies   Allergen Reactions     Codeine Hives           Clinical Assessment / Barriers to Care (Per Nurse):    None at this time.     Records Location:     [facility] Everywhere     Records Needed:     BREAST IMAGING DATING BACK TO 01/2021  PATHOLOGY SLIDES      Additional testing needed prior to consult:     NONE AT THIS TIME    Referral updates and Plan:       Consult with Surgical Oncology     02/09/2021 12:10 PM - patient emailed providers requesting consult for second opinion. Confirmed patient information and that scheduling would reach out to confirm appointment.     Evonne Edward RN, BSN   Surgical Oncology New Patient Nurse Navigator  [facility] [place]  [phone]

## 2021-02-10 ENCOUNTER — COMMUNICATION - HEALTHEAST (OUTPATIENT)
Dept: ONCOLOGY | Facility: HOSPITAL | Age: 61
End: 2021-02-10

## 2021-02-10 ENCOUNTER — COMMUNICATION - HEALTHEAST (OUTPATIENT)
Dept: SURGERY | Facility: CLINIC | Age: 61
End: 2021-02-10

## 2021-02-10 NOTE — TELEPHONE ENCOUNTER
ONCOLOGY INTAKE: Records Information      APPT INFORMATION: 2/11/21 - [facility] - Video  Referring provider:  Karla Darling  Referring provider s clinic:  [facility]  Reason for visit/diagnosis:  Breast Cancer  Has patient been notified of appointment date and time?: Yes    RECORDS INFORMATION:  Were the records received with the referral (via [employer])? Internal referral    Has patient been seen for any external appt for this diagnosis? No    ADDITIONAL INFORMATION:  Scheduled via [employer]  I called Jessika to confirm the appt with Dr Galeana for tomorrow 2/11/21 at 830am - Jessika stated she was driving & asked me to call her back with details at 4pm so she could write them down.  Virtual visit instructions sent to Jessika via [employer]

## 2021-02-11 ENCOUNTER — PRE VISIT (OUTPATIENT)
Dept: ONCOLOGY | Facility: CLINIC | Age: 61
End: 2021-02-11

## 2021-02-12 ENCOUNTER — OFFICE VISIT - HEALTHEAST (OUTPATIENT)
Dept: ONCOLOGY | Facility: CLINIC | Age: 61
End: 2021-02-12

## 2021-02-12 DIAGNOSIS — Z17.0 MALIGNANT NEOPLASM OF RIGHT BREAST IN FEMALE, ESTROGEN RECEPTOR POSITIVE, UNSPECIFIED SITE OF BREAST (H): ICD-10-CM

## 2021-02-12 DIAGNOSIS — Z80.0 FAMILY HISTORY OF PANCREATIC CANCER: ICD-10-CM

## 2021-02-12 DIAGNOSIS — Z71.83 ENCOUNTER FOR NONPROCREATIVE GENETIC COUNSELING: ICD-10-CM

## 2021-02-12 DIAGNOSIS — C50.911 MALIGNANT NEOPLASM OF RIGHT BREAST IN FEMALE, ESTROGEN RECEPTOR POSITIVE, UNSPECIFIED SITE OF BREAST (H): ICD-10-CM

## 2021-02-12 DIAGNOSIS — Z85.820 PERSONAL HISTORY OF MALIGNANT MELANOMA: ICD-10-CM

## 2021-02-12 DIAGNOSIS — Z80.42 FAMILY HISTORY OF PROSTATE CANCER IN FATHER: ICD-10-CM

## 2021-02-12 DIAGNOSIS — Z80.8 FAMILY HISTORY OF MALIGNANT MELANOMA: ICD-10-CM

## 2021-02-12 NOTE — TELEPHONE ENCOUNTER
Action    Action Taken 2/12/21:    -Imaging previously resolved to PACS    -[facility] () Path: [id number]     -2/17/21:  Slides from [facility] rec'd - taken to 5th floor lab @ [facility]  3:48 PM       RECORDS STATUS - BREAST    RECORDS REQUESTED FROM: [facility]   DATE REQUESTED:    NOTES DETAILS STATUS   OFFICE NOTE from referring provider [facility] Dr. Karla Carreon   OFFICE NOTE from medical oncologist     OFFICE NOTE from surgeon     OFFICE NOTE from radiation oncologist  - [facility] 2/9/21: Dr. Kemp   DISCHARGE SUMMARY from hospital     DISCHARGE REPORT from the ER     OPERATIVE REPORT     MEDICATION LIST     CLINICAL TRIAL TREATMENTS TO DATE     LABS     PATHOLOGY REPORTS  (Tissue diagnosis, Stage, ER/MO percentage positive and intensity of staining, HER2 IHC, FISH, and all biopsies from breast and any distant metastasis)                 [facility], Slides requested 2/12 1/27/21: [id number]   GENONOMIC TESTING     TYPE:   (Next Generation Sequencing, including Foundation One testing, and Oncotype score)     IMAGING (NEED IMAGES & REPORT)     CT SCANS     MRI     MAMMO PACS 1/27/21, 10/14/19, 9/17/19, 9/2/17, 6/3/16, 3/17/15, 9/7/12: [facility]   ULTRASOUND PACS 1/27/21: [facility]   PET     BONE SCAN     BRAIN MRI

## 2021-02-16 ENCOUNTER — AMBULATORY - HEALTHEAST (OUTPATIENT)
Dept: INFUSION THERAPY | Facility: HOSPITAL | Age: 61
End: 2021-02-16

## 2021-02-16 ENCOUNTER — RECORDS - HEALTHEAST (OUTPATIENT)
Dept: ADMINISTRATIVE | Facility: OTHER | Age: 61
End: 2021-02-16

## 2021-02-16 DIAGNOSIS — Z85.820 PERSONAL HISTORY OF MALIGNANT MELANOMA: ICD-10-CM

## 2021-02-16 DIAGNOSIS — Z80.42 FAMILY HISTORY OF PROSTATE CANCER IN FATHER: ICD-10-CM

## 2021-02-16 DIAGNOSIS — C50.911 MALIGNANT NEOPLASM OF RIGHT BREAST IN FEMALE, ESTROGEN RECEPTOR POSITIVE, UNSPECIFIED SITE OF BREAST (H): ICD-10-CM

## 2021-02-16 DIAGNOSIS — Z17.0 MALIGNANT NEOPLASM OF RIGHT BREAST IN FEMALE, ESTROGEN RECEPTOR POSITIVE, UNSPECIFIED SITE OF BREAST (H): ICD-10-CM

## 2021-02-16 DIAGNOSIS — Z80.0 FAMILY HISTORY OF PANCREATIC CANCER: ICD-10-CM

## 2021-02-16 DIAGNOSIS — Z80.8 FAMILY HISTORY OF MALIGNANT MELANOMA: ICD-10-CM

## 2021-02-17 ENCOUNTER — COMMUNICATION - HEALTHEAST (OUTPATIENT)
Dept: SURGERY | Facility: CLINIC | Age: 61
End: 2021-02-17

## 2021-02-17 ENCOUNTER — AMBULATORY - HEALTHEAST (OUTPATIENT)
Dept: NURSING | Facility: CLINIC | Age: 61
End: 2021-02-17

## 2021-02-18 ENCOUNTER — PRE VISIT (OUTPATIENT)
Dept: ONCOLOGY | Facility: CLINIC | Age: 61
End: 2021-02-18

## 2021-02-18 ENCOUNTER — ONCOLOGY VISIT (OUTPATIENT)
Dept: ONCOLOGY | Facility: CLINIC | Age: 61
End: 2021-02-18
Attending: SURGERY
Payer: COMMERCIAL

## 2021-02-18 VITALS
HEIGHT: 63 IN | RESPIRATION RATE: 16 BRPM | DIASTOLIC BLOOD PRESSURE: 58 MMHG | BODY MASS INDEX: 20.32 KG/M2 | TEMPERATURE: 97.6 F | OXYGEN SATURATION: 98 % | WEIGHT: 114.7 LBS | SYSTOLIC BLOOD PRESSURE: 97 MMHG | HEART RATE: 66 BPM

## 2021-02-18 DIAGNOSIS — Z17.0 MALIGNANT NEOPLASM OF CENTRAL PORTION OF RIGHT BREAST IN FEMALE, ESTROGEN RECEPTOR POSITIVE (H): ICD-10-CM

## 2021-02-18 DIAGNOSIS — C50.111 MALIGNANT NEOPLASM OF CENTRAL PORTION OF RIGHT BREAST IN FEMALE, ESTROGEN RECEPTOR POSITIVE (H): ICD-10-CM

## 2021-02-18 PROBLEM — M70.70 BURSITIS, HIP: Status: ACTIVE | Noted: 2020-06-06

## 2021-02-18 PROBLEM — M94.9 DISORDER OF BONE AND CARTILAGE: Status: ACTIVE | Noted: 2021-02-18

## 2021-02-18 PROBLEM — R19.8 CLENCHING OF TEETH: Status: ACTIVE | Noted: 2021-02-18

## 2021-02-18 PROBLEM — M79.18 MYOFASCIAL PAIN: Status: ACTIVE | Noted: 2020-07-13

## 2021-02-18 PROBLEM — F33.0 MILD RECURRENT MAJOR DEPRESSION (H): Status: ACTIVE | Noted: 2021-02-18

## 2021-02-18 PROBLEM — G44.40 MEDICATION OVERUSE HEADACHE: Status: ACTIVE | Noted: 2020-07-14

## 2021-02-18 PROBLEM — R51.9 HEADACHE: Status: ACTIVE | Noted: 2021-02-18

## 2021-02-18 PROBLEM — M89.9 DISORDER OF BONE AND CARTILAGE: Status: ACTIVE | Noted: 2021-02-18

## 2021-02-18 PROBLEM — F41.9 ANXIETY: Status: ACTIVE | Noted: 2021-02-18

## 2021-02-18 PROCEDURE — 99205 OFFICE O/P NEW HI 60 MIN: CPT | Performed by: SURGERY

## 2021-02-18 PROCEDURE — G0463 HOSPITAL OUTPT CLINIC VISIT: HCPCS

## 2021-02-18 PROCEDURE — 99417 PROLNG OP E/M EACH 15 MIN: CPT | Performed by: SURGERY

## 2021-02-18 PROCEDURE — 999N001032 HC STATISTIC REVIEW OUTSIDE SLIDES TC 88321: Performed by: SURGERY

## 2021-02-18 PROCEDURE — 88321 CONSLTJ&REPRT SLD PREP ELSWR: CPT | Performed by: PATHOLOGY

## 2021-02-18 RX ORDER — TRAZODONE HYDROCHLORIDE 50 MG/1
50 TABLET, FILM COATED ORAL AT BEDTIME
COMMUNITY
Start: 2021-02-01

## 2021-02-18 ASSESSMENT — MIFFLIN-ST. JEOR: SCORE: 1063.66

## 2021-02-18 ASSESSMENT — PAIN SCALES - GENERAL: PAINLEVEL: NO PAIN (0)

## 2021-02-18 NOTE — NURSING NOTE
"Oncology Rooming Note    February 18, 2021 2:25 PM   Jessika Hunt is a 60 year old female who presents for:    Chief Complaint   Patient presents with     Oncology Clinic Visit     NEW; BREAST CANCER     Initial Vitals: BP 97/58 (BP Location: Right arm, Patient Position: Sitting, Cuff Size: Adult Regular)   Pulse 66   Temp 97.6  F (36.4  C) (Oral)   Resp 16   Ht 1.599 m (5' 2.95\")   Wt 52 kg (114 lb 11.2 oz)   SpO2 98%   BMI 20.35 kg/m   Estimated body mass index is 20.35 kg/m  as calculated from the following:    Height as of this encounter: 1.599 m (5' 2.95\").    Weight as of this encounter: 52 kg (114 lb 11.2 oz). Body surface area is 1.52 meters squared.  No Pain (0) Comment: Data Unavailable   No LMP recorded. Patient is postmenopausal.  Allergies reviewed: Yes  Medications reviewed: Yes    Medications: Medication refills not needed today.  Pharmacy name entered into [employer]: [id number] - [address] AT [address]    Clinical concerns: 2ND OPINION.        Minoo Nettles CMA              "

## 2021-02-18 NOTE — LETTER
2/18/2021         RE: Jessika Hunt  [address]   [place] [address]        Dear Colleague,    Thank you for referring your patient, Jessika Hunt, to the [facility] BREAST [facility]. Please see a copy of my visit note below.    NEW SURGICAL CONSULTATION  Feb 18, 2021    Jessika Hunt is a 60 year old woman who presents with a right  breast complaint.  She was referred by Karla Darling DO.    HPI:    She noted no masses in either breast, axilla, or neck. She denies any nipple discharge or nipple inversion.     Imaging showed a 1.0 cm mass at 9:00 2 cm FN in the RIGHT breast.    A biopsy was performed and a clip was placed.  It showed invasive ductal carcinoma, grade 1, ER+ VT- HER2/shiloh non-amplified.    BREAST-SPECIFIC HISTORY:  Prior breast surgeries: Yes - bilateral silicone smooth implants placed 2014 (partially retropectoral) - no issues   Prior radiation history: No  Hormone replacement therapy: Yes - combi-patch x 5 years since age 51  Menopausal: Post at age 51  Bra size: 32 D; previously 32 C  Dominant hand: Right    FAMILY HISTORY:  Breast ca: No  Ovarian ca: No  Pancreatic ca: Yes - father (dx 79, treated by Dr Sarmiento here)    Blood drawn on 2/16/2021    No past medical history on file.  Melanoma on left arm ?2016 - [facility] only by Dr Hutchinson  2nd dose of COVID-19 vaccine recently.    Past Surgical History:   Procedure Laterality Date     LAPAROSCOPY DIAGNOSTIC (GYN)      ectopic     No GA issues    Current Outpatient Medications   Medication Sig Dispense Refill     BUTALBITAL-APAP-CAFF-COD PO Take 1 tablet by mouth at onset of headache        Eletriptan Hydrobromide (RELPAX PO) Take 40 mg by mouth at onset of headache        FLUoxetine HCl (PROZAC PO) Take 20 mg by mouth daily       metoclopramide (REGLAN) 5 MG tablet Take 1-2 tablets (5-10 mg) by mouth once as needed (migraine - taken with excedrin or ibuprofen) 10 tablet 0     traZODone (DESYREL) 50 MG tablet Take 50 mg by mouth  "At Bedtime        Allergies   Allergen Reactions     Codeine Hives        SOCIAL HISTORY:  Smokes: No - quit 2002; occasionally smokes still    She works as a PCA at [employer].  Doesn't lift a ton.    ROS:  Back pain: No - has osteoporosis  Headache: Yes - longstanding migraines  Abdominal pain: No  Unexpected weight loss: No  Easy bruising/bleeding: No    BP 97/58 (BP Location: Right arm, Patient Position: Sitting, Cuff Size: Adult Regular)   Pulse 66   Temp 97.6  F (36.4  C) (Oral)   Resp 16   Ht 1.599 m (5' 2.95\")   Wt 52 kg (114 lb 11.2 oz)   SpO2 98%   BMI 20.35 kg/m     Physical Exam  Constitutional:       Appearance: She is well-developed.   Chest:      Breasts: Breasts are symmetrical.         Right: No inverted nipple, mass, nipple discharge, skin change or tenderness.         Left: No inverted nipple, mass, nipple discharge, skin change or tenderness.          Comments: Patient was examined in both supine and upright positions.  Bilateral inframammary scars from augmentation.  Lymphadenopathy:      Cervical: No cervical adenopathy.      Right cervical: No superficial, deep or posterior cervical adenopathy.     Left cervical: No superficial, deep or posterior cervical adenopathy.      Upper Body:      Right upper body: No supraclavicular, axillary or pectoral adenopathy.      Left upper body: Axillary adenopathy (1.5 cm mobile rubbery node) present. No supraclavicular or pectoral adenopathy.      Comments: No lymphedema in bilateral upper extremities.   Skin:     General: Skin is warm and dry.          INVESTIGATIONS:    Diagnostic Mammogram & Ultrasound from [facility] (1/27/2021) showed:  MAMMOGRAPHIC FINDINGS: Right full-field digital diagnostic mammogram performed. The breasts are extremely dense, which lowers the sensitivity of mammography. Breast tomosynthesis was used in interpretation. On the additional tomographic images, asymmetry and distortion are again seen in the 9:00 position 2 cm " from the nipple. The appearance is suspicious for malignancy. A few scattered benign-appearing round calcifications are again seen in the breast. Subpectoral implant is again noted.  ULTRASOUND FINDINGS: Targeted right breast ultrasound was performed by both the ultrasonographer and the radiologist. In the 9:00 position 2 cm from the nipple, there was a hypoechoic mass with angular margins measuring 1 x 0.9 x 0.9 cm. The appearance is suspicious for malignancy and the mass correlates with the mammographic abnormality.  The right axilla was examined as well with no evidence of lymphadenopathy.  ACR BI-RADS Category 5: Highly Suggestive of Malignancy    Screening Mammogram from [facility] (1/20/2021) showed:  FINDINGS: Bilateral screening mammogram was performed with the assistance of Computer-Aided Detection and breast tomosynthesis. The breasts are extremely dense, which lowers the sensitivity of mammography.   There is an asymmetry in the right breast at the 9 o'clock position at middle depth.  There are implants present in the left and right breasts. No suspicious findings of the left breast.  BI-RADS 0    Biopsy from [facility] (1/27/2021) showed:  BREAST, RIGHT, MASS, 9 O'CLOCK, 2 CM FROM NIPPLE, ULTRASOUND GUIDED NEEDLE CORE BIOPSY:    -  INVASIVE DUCTAL CARCINOMA, CHITO GRADE 1 (TUBULES 2, NUCLEI 2, MITOSES 1) WITH A FOCAL LOBULAR GROWTH PATTERN    -  GREATEST TUMOR LENGTH, 8 MM    -  DCIS IS NOT IDENTIFIED  ER positive  VA positive  HER2/shiloh equivocal by IHC (2+), negative by FISH    ASSESSMENT:  Jessika Hunt is a 60 year old woman with a right breast cancer.    Her stage is:  Cancer Staging  Malignant neoplasm of central portion of right breast in female, estrogen receptor positive (H)  Staging form: Breast, AJCC 8th Edition  - Clinical: Stage IA (cT1b, cN0, cM0, G1, ER+, VA-, HER2-) - Signed by Lorenza Akhtar MD on 2/18/2021    I personally reviewed the imaging above with our in-house breast  "radiologist.    I reviewed the imaging, diagnosis, staging, and management (including surgery, chemotherapy, radiation therapy, and endocrine therapy) of breast cancer with Jessika Hunt. A copy of the biopsy pathology report was also provided.    I recommended a contrast mammogram to evaluate the remainder of the right breast and also the left for surgical planning.    The mainstay of treatment for resectable breast cancer is surgical resection, in the form of either breast conservation (segmental mastectomy plus radiation) or mastectomy.  We reviewed that the two strategies are equivalent in terms of overall survival.  The advantages and disadvantages of each were discussed.    The surgeries are performed as day surgeries.  Jessika Hunt IS a candidate for breast conservation therapy.  We discussed that this involves two necessary components: the lumpectomy (or \"segmental mastectomy\"), and several weeks of whole breast radiation therapy.  We discussed that the overall survival after breast conservation therapy is identical to mastectomy and that local recurrence rates are significantly higher if segmental mastectomy was performed without subsequent radiation.  We also discussed the significance of clear margins and that a subsequent procedure may be necessary to achieve this.    Jessika Hunt was concerned about the cosmetic result and other side effects after radiation and I have recommended a referral to our radiation oncologist, Dr John, to further discuss.    Because the lesion is only vaguely palpable, a wire-localized approach would be taken for breast conservation.  She would present on the day of surgery for an image-guided wire placement, followed by a surgical excision in the operating room.  The risks of a wire-localized segmental mastectomy were discussed with the patient, including the risks of bleeding, wound infection, wound dehiscence, and post-operative contour change to the breast.  "     Alternatively, we also discussed the various types of mastectomy, including total, skin-sparing, and nipple-sparing mastectomy.  We reviewed that the nipple-sparing technique is cosmetic; sensation and contractility will likely be lost.  Jessika Hunt is not a candidate for nipple-sparing mastectomy owing to the relative size of the breast envelope and the resultant risk of nipple ischemia.  The risks of a mastectomy were discussed with the patient, including the risks of bleeding, wound infection, wound dehiscence, skin flap/nipple necrosis, poor cosmetic outcomes with skin folds, decreased shoulder range of motion, chest wall numbness, etc.    The option of having immediate versus delayed reconstruction was also discussed.   We reviewed that the advantages of immediate reconstruction includes superior cosmetics, as the skin is preserved.  However, the major disadvantage is increased postoperative risks, including skin flap ischemia and expander infection, which can potentially delay adjuvant oncologic treatments which may be needed post-surgically.  Jessika Hunt was interested in this; a Plastic Surgery consultation was offered and will be arranged.     We reviewed that depending on the margin and karina status post-mastectomy, radiation may be necessary.      In addition to the surgical management of the breast, a sentinel lymph node biopsy is recommended for karina staging of the axilla.  This is performed with the combination of the radioactive colloid and lymphazurin. The risks of a sentinel lymph node biopsy were discussed with the patient, including the risks of lymphedema (5-10%), bleeding, wound infection, wound dehiscence, seroma formation, and paresthesias. There is an approximately 10% false negative rate associated with sentinel lymph node biopsy as published in the literature.  The findings of the sentinel lymph node biopsy may result in the need for further surgery (i.e. Axillary lymph node  dissection) or radiation. There is a 1-2% chance of patients whose sentinel lymph nodes do not map despite dual tracer (radiocolloid and lymphazurin). Should this be the case, we discussed that I would proceed with an axillary lymph node dissection at the index procedure.  The higher risks of an axillary lymph node dissection were also reviewed, including lymphedema (20-30%), bleeding, wound infection, wound dehiscence, seroma formation, nerve injury, limited arm range of motion and paresthesias. We discussed that a drain would be placed intra-operatively should an axillary lymph node dissection be performed.     We discussed that surgical pathology results will be reviewed at the postoperative visit to allow for careful discussion of next steps and for answering questions.    Finally, we reviewed that as part of team-based approach to breast cancer, medical oncology and radiation oncology referrals will also be made after surgery to discuss adjuvant systemic/endocrine therapy and radiation therapy.  The decision regarding adjuvant chemotherapy will be made after surgery and we reviewed the role of Oncotype DX in this situation.     Her genetic testing is pending. With regards to the contralateral breast, we reviewed the risk reduction benefits of a contralateral risk-reducing mastectomy. We reviewed that a risk-reducing mastectomy does not eliminate the risk of breast cancer entirely, but rather is a relative risk reduction strategy. The absolute benefit, therefore, will be interpreted in the context of her risk of developing a de víctor breast cancer and the genetic testing results when they become available.       All of the above was discussed with Jessika Hunt and all questions were answered.  She elected to proceed with a plastic surgery and radiation oncology consultation, and will think about her surgical options.    Total time spent with the patient was 60 minutes, of which 75% was counseling.     PLAN:  1.  Bilateral Contrast Mammogram  2. Genetic testing pending  3. Plastic surgery referral  4. Radiation oncology referral  5. RIGHT axillary sentinel lymph node biopsy, possible axillary node dissection  6. RIGHT wire-localized segmental mastectomy versus RIGHT skin-sparing mastectomy  7. Follow up by phone after above completed.  8. Patient to report to her PCP for preoperative H&P and testing.    Lorenza Akhtar MD MSc [facility] FACS    Division of Surgical Oncology  [facility]     95 minutes spent on the date of the encounter doing chart review, review of outside records, review of test results, interpretation of tests, patient visit, documentation and discussion with other provider(s).      Again, thank you for allowing me to participate in the care of your patient.        Sincerely,        Lorenza Akhtar MD

## 2021-02-18 NOTE — PROGRESS NOTES
NEW SURGICAL CONSULTATION  Feb 18, 2021    Jessika Hunt is a 60 year old woman who presents with a right  breast complaint.  She was referred by Karla Darling DO.    HPI:    She noted no masses in either breast, axilla, or neck. She denies any nipple discharge or nipple inversion.     Imaging showed a 1.0 cm mass at 9:00 2 cm FN in the RIGHT breast.    A biopsy was performed and a clip was placed.  It showed invasive ductal carcinoma, grade 1, ER+ OK- HER2/shiloh non-amplified.    BREAST-SPECIFIC HISTORY:  Prior breast surgeries: Yes - bilateral silicone smooth implants placed 2014 (partially retropectoral) - no issues   Prior radiation history: No  Hormone replacement therapy: Yes - combi-patch x 5 years since age 51  Menopausal: Post at age 51  Bra size: 32 D; previously 32 C  Dominant hand: Right    FAMILY HISTORY:  Breast ca: No  Ovarian ca: No  Pancreatic ca: Yes - father (dx 79, treated by Dr Sarmiento here)    Blood drawn on 2/16/2021    No past medical history on file.  Melanoma on left arm ?2016 - WLE only by Dr Hutchinson  2nd dose of COVID-19 vaccine recently.    Past Surgical History:   Procedure Laterality Date     LAPAROSCOPY DIAGNOSTIC (GYN)      ectopic     No GA issues    Current Outpatient Medications   Medication Sig Dispense Refill     BUTALBITAL-APAP-CAFF-COD PO Take 1 tablet by mouth at onset of headache        Eletriptan Hydrobromide (RELPAX PO) Take 40 mg by mouth at onset of headache        FLUoxetine HCl (PROZAC PO) Take 20 mg by mouth daily       metoclopramide (REGLAN) 5 MG tablet Take 1-2 tablets (5-10 mg) by mouth once as needed (migraine - taken with excedrin or ibuprofen) 10 tablet 0     traZODone (DESYREL) 50 MG tablet Take 50 mg by mouth At Bedtime        Allergies   Allergen Reactions     Codeine Hives        SOCIAL HISTORY:  Smokes: No - quit 2002; occasionally smokes still    She works as a PCA at [employer].  Doesn't lift a ton.    ROS:  Back pain: No - has osteoporosis  Headache: Yes  "- longstanding migraines  Abdominal pain: No  Unexpected weight loss: No  Easy bruising/bleeding: No    BP 97/58 (BP Location: Right arm, Patient Position: Sitting, Cuff Size: Adult Regular)   Pulse 66   Temp 97.6  F (36.4  C) (Oral)   Resp 16   Ht 1.599 m (5' 2.95\")   Wt 52 kg (114 lb 11.2 oz)   SpO2 98%   BMI 20.35 kg/m     Physical Exam  Constitutional:       Appearance: She is well-developed.   Chest:      Breasts: Breasts are symmetrical.         Right: No inverted nipple, mass, nipple discharge, skin change or tenderness.         Left: No inverted nipple, mass, nipple discharge, skin change or tenderness.          Comments: Patient was examined in both supine and upright positions.  Bilateral inframammary scars from augmentation.  Lymphadenopathy:      Cervical: No cervical adenopathy.      Right cervical: No superficial, deep or posterior cervical adenopathy.     Left cervical: No superficial, deep or posterior cervical adenopathy.      Upper Body:      Right upper body: No supraclavicular, axillary or pectoral adenopathy.      Left upper body: Axillary adenopathy (1.5 cm mobile rubbery node) present. No supraclavicular or pectoral adenopathy.      Comments: No lymphedema in bilateral upper extremities.   Skin:     General: Skin is warm and dry.          INVESTIGATIONS:    Diagnostic Mammogram & Ultrasound from [facility] (1/27/2021) showed:  MAMMOGRAPHIC FINDINGS: Right full-field digital diagnostic mammogram performed. The breasts are extremely dense, which lowers the sensitivity of mammography. Breast tomosynthesis was used in interpretation. On the additional tomographic images, asymmetry and distortion are again seen in the 9:00 position 2 cm from the nipple. The appearance is suspicious for malignancy. A few scattered benign-appearing round calcifications are again seen in the breast. Subpectoral implant is again noted.  ULTRASOUND FINDINGS: Targeted right breast ultrasound was performed by both " the ultrasonographer and the radiologist. In the 9:00 position 2 cm from the nipple, there was a hypoechoic mass with angular margins measuring 1 x 0.9 x 0.9 cm. The appearance is suspicious for malignancy and the mass correlates with the mammographic abnormality.  The right axilla was examined as well with no evidence of lymphadenopathy.  ACR BI-RADS Category 5: Highly Suggestive of Malignancy    Screening Mammogram from [facility] (1/20/2021) showed:  FINDINGS: Bilateral screening mammogram was performed with the assistance of Computer-Aided Detection and breast tomosynthesis. The breasts are extremely dense, which lowers the sensitivity of mammography.   There is an asymmetry in the right breast at the 9 o'clock position at middle depth.  There are implants present in the left and right breasts. No suspicious findings of the left breast.  BI-RADS 0    Biopsy from [facility] (1/27/2021) showed:  BREAST, RIGHT, MASS, 9 O'CLOCK, 2 CM FROM NIPPLE, ULTRASOUND GUIDED NEEDLE CORE BIOPSY:    -  INVASIVE DUCTAL CARCINOMA, CHITO GRADE 1 (TUBULES 2, NUCLEI 2, MITOSES 1) WITH A FOCAL LOBULAR GROWTH PATTERN    -  GREATEST TUMOR LENGTH, 8 MM    -  DCIS IS NOT IDENTIFIED  ER positive  NE positive  HER2/shiloh equivocal by IHC (2+), negative by FISH    ASSESSMENT:  Jessika Hunt is a 60 year old woman with a right breast cancer.    Her stage is:  Cancer Staging  Malignant neoplasm of central portion of right breast in female, estrogen receptor positive (H)  Staging form: Breast, AJCC 8th Edition  - Clinical: Stage IA (cT1b, cN0, cM0, G1, ER+, NE-, HER2-) - Signed by Lorenza Akhtar MD on 2/18/2021    I personally reviewed the imaging above with our in-house breast radiologist.    I reviewed the imaging, diagnosis, staging, and management (including surgery, chemotherapy, radiation therapy, and endocrine therapy) of breast cancer with Jessika Hunt. A copy of the biopsy pathology report was also provided.    I recommended  "a contrast mammogram to evaluate the remainder of the right breast and also the left for surgical planning.    The mainstay of treatment for resectable breast cancer is surgical resection, in the form of either breast conservation (segmental mastectomy plus radiation) or mastectomy.  We reviewed that the two strategies are equivalent in terms of overall survival.  The advantages and disadvantages of each were discussed.    The surgeries are performed as day surgeries.  Jessika Hunt IS a candidate for breast conservation therapy.  We discussed that this involves two necessary components: the lumpectomy (or \"segmental mastectomy\"), and several weeks of whole breast radiation therapy.  We discussed that the overall survival after breast conservation therapy is identical to mastectomy and that local recurrence rates are significantly higher if segmental mastectomy was performed without subsequent radiation.  We also discussed the significance of clear margins and that a subsequent procedure may be necessary to achieve this.    Jessika Hunt was concerned about the cosmetic result and other side effects after radiation and I have recommended a referral to our radiation oncologist, Dr John, to further discuss.    Because the lesion is only vaguely palpable, a wire-localized approach would be taken for breast conservation.  She would present on the day of surgery for an image-guided wire placement, followed by a surgical excision in the operating room.  The risks of a wire-localized segmental mastectomy were discussed with the patient, including the risks of bleeding, wound infection, wound dehiscence, and post-operative contour change to the breast.      Alternatively, we also discussed the various types of mastectomy, including total, skin-sparing, and nipple-sparing mastectomy.  We reviewed that the nipple-sparing technique is cosmetic; sensation and contractility will likely be lost.  Jessika Hunt is not a candidate " for nipple-sparing mastectomy owing to the relative size of the breast envelope and the resultant risk of nipple ischemia.  The risks of a mastectomy were discussed with the patient, including the risks of bleeding, wound infection, wound dehiscence, skin flap/nipple necrosis, poor cosmetic outcomes with skin folds, decreased shoulder range of motion, chest wall numbness, etc.    The option of having immediate versus delayed reconstruction was also discussed.   We reviewed that the advantages of immediate reconstruction includes superior cosmetics, as the skin is preserved.  However, the major disadvantage is increased postoperative risks, including skin flap ischemia and expander infection, which can potentially delay adjuvant oncologic treatments which may be needed post-surgically.  Jessika Hunt was interested in this; a Plastic Surgery consultation was offered and will be arranged.     We reviewed that depending on the margin and karina status post-mastectomy, radiation may be necessary.      In addition to the surgical management of the breast, a sentinel lymph node biopsy is recommended for karina staging of the axilla.  This is performed with the combination of the radioactive colloid and lymphazurin. The risks of a sentinel lymph node biopsy were discussed with the patient, including the risks of lymphedema (5-10%), bleeding, wound infection, wound dehiscence, seroma formation, and paresthesias. There is an approximately 10% false negative rate associated with sentinel lymph node biopsy as published in the literature.  The findings of the sentinel lymph node biopsy may result in the need for further surgery (i.e. Axillary lymph node dissection) or radiation. There is a 1-2% chance of patients whose sentinel lymph nodes do not map despite dual tracer (radiocolloid and lymphazurin). Should this be the case, we discussed that I would proceed with an axillary lymph node dissection at the index procedure.  The  higher risks of an axillary lymph node dissection were also reviewed, including lymphedema (20-30%), bleeding, wound infection, wound dehiscence, seroma formation, nerve injury, limited arm range of motion and paresthesias. We discussed that a drain would be placed intra-operatively should an axillary lymph node dissection be performed.     We discussed that surgical pathology results will be reviewed at the postoperative visit to allow for careful discussion of next steps and for answering questions.    Finally, we reviewed that as part of team-based approach to breast cancer, medical oncology and radiation oncology referrals will also be made after surgery to discuss adjuvant systemic/endocrine therapy and radiation therapy.  The decision regarding adjuvant chemotherapy will be made after surgery and we reviewed the role of Oncotype DX in this situation.     Her genetic testing is pending. With regards to the contralateral breast, we reviewed the risk reduction benefits of a contralateral risk-reducing mastectomy. We reviewed that a risk-reducing mastectomy does not eliminate the risk of breast cancer entirely, but rather is a relative risk reduction strategy. The absolute benefit, therefore, will be interpreted in the context of her risk of developing a de víctor breast cancer and the genetic testing results when they become available.       All of the above was discussed with Jessika Hunt and all questions were answered.  She elected to proceed with a plastic surgery and radiation oncology consultation, and will think about her surgical options.    Total time spent with the patient was 60 minutes, of which 75% was counseling.     PLAN:  1. Bilateral Contrast Mammogram  2. Genetic testing pending  3. Plastic surgery referral  4. Radiation oncology referral  5. RIGHT axillary sentinel lymph node biopsy, possible axillary node dissection  6. RIGHT wire-localized segmental mastectomy versus RIGHT skin-sparing  mastectomy  7. Follow up by phone after above completed.  8. Patient to report to her PCP for preoperative H&P and testing.    Lorenza Akhtar MD MSc [facility] FACS    Division of Surgical Oncology  [facility]     95 minutes spent on the date of the encounter doing chart review, review of outside records, review of test results, interpretation of tests, patient visit, documentation and discussion with other provider(s).

## 2021-02-19 PROBLEM — Z78.0 OSTEOPENIA AFTER MENOPAUSE: Status: ACTIVE | Noted: 2019-01-01

## 2021-02-19 PROBLEM — M25.559 PAIN IN JOINT INVOLVING PELVIC REGION AND THIGH: Status: ACTIVE | Noted: 2021-02-19

## 2021-02-19 PROBLEM — M85.80 OSTEOPENIA AFTER MENOPAUSE: Status: ACTIVE | Noted: 2019-01-01

## 2021-02-19 PROBLEM — M62.830 SPASM OF THORACIC BACK MUSCLE: Status: ACTIVE | Noted: 2021-02-19

## 2021-02-19 PROBLEM — G44.229 CHRONIC TENSION-TYPE HEADACHE: Status: ACTIVE | Noted: 2020-07-13

## 2021-02-19 PROBLEM — M81.0 SENILE OSTEOPOROSIS: Status: ACTIVE | Noted: 2021-02-19

## 2021-02-19 PROBLEM — M81.0 OSTEOPOROSIS OF LUMBAR SPINE: Status: ACTIVE | Noted: 2020-01-01

## 2021-02-19 PROBLEM — G43.009 MIGRAINE WITHOUT AURA: Status: ACTIVE | Noted: 2020-07-14

## 2021-02-19 PROBLEM — M54.2 NECK PAIN: Status: ACTIVE | Noted: 2020-07-14

## 2021-02-19 PROBLEM — M26.629 ARTHRALGIA OF TEMPOROMANDIBULAR JOINT: Status: ACTIVE | Noted: 2020-07-13

## 2021-02-19 PROBLEM — G44.89 CHRONIC MIXED HEADACHE SYNDROME: Status: ACTIVE | Noted: 2020-07-14

## 2021-02-19 PROBLEM — Z78.0 POSTMENOPAUSAL: Status: ACTIVE | Noted: 2021-02-19

## 2021-02-22 ENCOUNTER — TELEPHONE (OUTPATIENT)
Dept: RADIATION ONCOLOGY | Facility: CLINIC | Age: 61
End: 2021-02-22

## 2021-02-22 ENCOUNTER — PATIENT OUTREACH (OUTPATIENT)
Dept: CARE COORDINATION | Facility: CLINIC | Age: 61
End: 2021-02-22

## 2021-02-22 NOTE — TELEPHONE ENCOUNTER
Attempt made by author to contact this patient by phone number listed in chart to schedule a visit with Radiation Oncology at the request of their referring provider.      Outcome:     Author left voice message with author's name, department, reason for calling and direct callback phone number.

## 2021-02-22 NOTE — PROGRESS NOTES
Oncology Distress Screening Follow-up  Clinical Social Work  [facility]    Identified Concern and Score From Distress Screening:   3. How concerned are you about feeling depressed or very sad?   6Abnormal            4. How concerned are you about feeling anxious or very scared?   6Abnormal                  Date of Distress Screenin21      Data: Jessika Hunt is a 60 year old woman with a right breast cancer.      Intervention/Education Provided:  covering for FERMIN Mercado called and spoke with patient addressing concerns patient expressed in distress screen. Patient discussed recent visit and how they are coping with diagnosis and treatment options. Patient reported they are feeling overwhelmed by decision they are facing with surgery. They reported they have some up coming appointments that they are hoping will provide some additional information that will help them better decide how they will move forward with treatment. Patient stated they feel that once they make the decision this will provide them with some relief, not dealing with the unknown as much.  discussed resources available to them, ie. Support groups, counseling services, financial resources etc. Patient was happy to hear these resources were available to them and expressed interest in knowing more information about support groups. Patient stated they were not ready for the resources yet, but would call back for more information once they completed their upcoming appointments and were decided on their treatment path.  provided contact information for patient's assigned  FERMIN Mercado for on going supports. Patient took this information and agreed to follow up with any future needs or questions.       Follow-up Required: None required at this time. Patient has contact information of FERMIN Mercado for on going supports.         Maryuri Stewart MSW, LGSW  -  Oncology  Phone : [phone]  Pager: [phone]

## 2021-02-23 ENCOUNTER — TELEPHONE (OUTPATIENT)
Dept: SURGERY | Facility: CLINIC | Age: 61
End: 2021-02-23

## 2021-02-23 NOTE — TELEPHONE ENCOUNTER
I received a call from Jessika requesting to cancel her appointment for today, 2/23/21 at 10am with Dr Torres due to a migraine. Jessika also stated that she needs to see Dr Torres this week due to a scheduled surgery. Per [facility] Dr Torres is not in clinic the rest of the week, and his first available appt would be 3/9/21 which patient declined. Jessika is requesting a call back from Dr Torres's nurse -routing to Makeda COLEMAN (RN) for follow-up call to patient as requested..

## 2021-02-23 NOTE — PROGRESS NOTES
Department of Radiation Oncology                   [address]  [address] [place]  [address]  Office:  [phone]  Fax:  [phone]   Radiation Oncology Clinic  [address] [place] [address]  Phone:  [phone]  Fax:  [phone]     RE: Jessika Hunt : 1960   MRN: [id number] REYNA: 2021       OUTPATIENT VISIT NOTE       PROBLEM: early stage right breast cancer     HISTORY OF PRESENT ILLNESS:   Ms. Hunt is a 60 year old woman with a history of early stage melanoma, silicone breast implant augmentation in , and more recent diagnosis of invasive ductal carcinoma of the upper outer right breast, grade 1, ER+/SD-/HER2-, [id number].  She was initially diagnosed through the [facility] system but is now receiving a second opinion at [facility].    She underwent screening mammogram on 2021 that showed an asymmetry in the right breast at the 9 o'clock position mid depth.  Diagnostic mammogram on 2021 confirmed the asymmetry and distortion. Ultrasound demonstrated a 1 x 0.9 x 0.9 cm hypoechoic mass at the 9 o'clock position 2 cm from the nipple. Right axillary ultrasound did not demonstrate lymphadenopathy.  Ultrasound-guided biopsy was performed that day with pathology ([id number]) demonstrating grade 1 invasive ductal carcinoma, ER >95% strong, SD negative, HER2 indeterminate (2+) by IHC, non-amplified by FISH. She was seen by Dr. Karla Darling in surgery at [facility] in [place] who did not detect lymphadenopathy on exam. She was also seen by genetic counseling and [facility] genetic testing was ordered and is pending. Dr. Darling discussed lumpectomy with radiation vs. mastectomy with reconstruction.  She met with Dr. Kemp in Radiation Oncology who discussed adjuvant radiation if necessary.       She was seen by Dr. Akhtar in surgery at [facility] for a second opinion on 2021 who noted a 1.5 cm mobile rubbery left axillary lymph node.  They  discussed surgical options and she was referred to radiation oncology for discussion of adjuvant therapy.     Contrast enhanced mammogram and breast ultrasound are scheduled for later today.    On interview today, patient reports she is in good health and doing well. She says she can feel a small mass when palpating her right breast. She reports no breast pain, nipple discharge, chest wall tenderness or other masses in her breast, neck or axilla.    PMH:   Past Medical History:   Diagnosis Date     Breast cancer (H) 01/2021     Melanoma of skin (H)     Lt arm   Melanoma in left arm, s/p WLE by Dr. Hutchinson in 2016    PSH:  Past Surgical History:   Procedure Laterality Date     LAPAROSCOPY DIAGNOSTIC (GYN)      ectopic   Bilateral breast augmentation in 2014    MEDICATIONS:  Current Outpatient Medications   Medication Sig Dispense Refill     BUTALBITAL-APAP-CAFF-COD PO Take 1 tablet by mouth at onset of headache        Eletriptan Hydrobromide (RELPAX PO) Take 40 mg by mouth at onset of headache        FLUoxetine HCl (PROZAC PO) Take 20 mg by mouth daily       metoclopramide (REGLAN) 5 MG tablet Take 1-2 tablets (5-10 mg) by mouth once as needed (migraine - taken with excedrin or ibuprofen) 10 tablet 0     traZODone (DESYREL) 50 MG tablet Take 50 mg by mouth At Bedtime         ALLERGY:  Allergies   Allergen Reactions     Codeine Hives       FAMILY HISTORY:  Pancreatic cancer in father diagnosed at age 79, and passed away  Father also had prostate cancer at age 78  Sister with ocular melanoma diagnosed at age 67    SOCIAL HISTORY:  Quit smoking in 2002, occasionally still smokes  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    GYN HISTORY:  LMP age 51, HRT for 5 years after age 51    ECOG PERFORMANCE STATUS: 0    HISTORY OF RADIATION: None  IMPLANTED CARDIAC DEVICE: None  PREGNANCY RISK: Menopause at about 51 years old    REVIEW OF SYMPTOMS:  A full 10-point review of systems was performed as per nursing  note.  Pertinent negatives and positives reviewed.    PHYSICAL EXAMINATION:    Gen: Alert, in NAD  Eyes: EOMI, sclera anicteric  HENT      Head: NC/AT     Ears: No external auricular lesions  Pulm: Breathing comfortably on room air, no audible wheezes or ronchi  CV: Well-perfused, no cyanosis  Abdominal: Soft, nondistended  Skin: Normal color and turgor  Neurologic/MSK: motor grossly intact, normal gait  Lymph: no axillary, infraclavicular, or supraclavicular lymphadenopathy  Breast: s/p bilateral breast augmentation. There is an approximately 1 cm mobile firm mass about 2 cm from the nipple at the 9:00 position in the right breast  Psych: Appropriate mood and affect    ASSESSMENT AND PLAN: Ms. Hunt is a 60 year old woman with a history of early stage melanoma, silicone breast implant augmentation, and more recent diagnosis of invasive ductal carcinoma of the upper outer right breast, grade 1, ER+/WA-/HER2-, [id number], here today for discussion of the treatment options, specifically the potential for adjuvant radiotherapy following surgery.    We discussed the therapeutic options available to the patient based on the size, location and pathology from biopsy of the lesion; desired cosmetic outcomes; and risk of local recurrence after surgical excision. We recommended either mastectomy with immediate reconstruction or lumpectomy with adjuvant radiotherapy for equivalent cancer outcomes. It is unlikely but possible she may need post-mastectomy radiation therapy depending on pathology at the time of surgery, such as finding of a positive lymph node or less likely positive surgical margin. We reviewed the available evidence for cosmetic outcomes for patients who have had breast augmentation prior to undergoing radiotherapy, with older studies showing capsular contracture rates in excess of 50-60% and more recent study showing approximately 25-30%.     Genetic testing is pending, which may also guide the patient's  decision-making.    We discussed the benefits of adjuvant radiotherapy after lumpectomy. We mentioned that local recurrence rates are significantly higher if lumpectomy is performed without subsequent radiation. The patient is aware that depending on the margins and karina status post-mastectomy, adjuvant radiation may be necessary in that situation. We discussed the common side effects of radiotherapy, including skin changes and tissue fibrosis, as well as rare cardiac and pulmonary effects.    Plan:  - Bilateral contrast mammogram per surgical oncology.  - Patient plans to meet with plastic surgery at [facility] for a second opinion.  - Follow-up with patient depending on her chosen course and location of care    Thank you for allowing us to participate in this patient's care.  Please feel free to call with any questions or concerns.    The patient was seen and assessed with staff, Dr. John, who agrees with the above assessment and plan.      Delphine Campos MS-4     Ovidio Salazar MD PGY-4  Radiation Oncology, [facility] [place]  [phone] clinic  Pager [phone]           I saw and examined the patient with the resident.  I have reviewed and edited the resident's note and agree with the plan of care.      I reviewed patient's chart, internal/external medical records, imaging studies (including actual images), labs and pathology reports.  I interviewed and counseled the patient face to face.  I additionally discussed the case with patient's referring physicians and care team.     80 minutes were spent on the date of the encounter doing chart review, history and exam, documentation and further activities as noted above.     Zhao John MD

## 2021-02-24 ENCOUNTER — ANCILLARY PROCEDURE (OUTPATIENT)
Dept: MAMMOGRAPHY | Facility: CLINIC | Age: 61
End: 2021-02-24
Attending: SURGERY
Payer: COMMERCIAL

## 2021-02-24 ENCOUNTER — OFFICE VISIT (OUTPATIENT)
Dept: RADIATION ONCOLOGY | Facility: CLINIC | Age: 61
End: 2021-02-24
Attending: SURGERY
Payer: COMMERCIAL

## 2021-02-24 VITALS
DIASTOLIC BLOOD PRESSURE: 67 MMHG | BODY MASS INDEX: 19.76 KG/M2 | WEIGHT: 111.4 LBS | SYSTOLIC BLOOD PRESSURE: 123 MMHG | HEART RATE: 95 BPM | OXYGEN SATURATION: 96 %

## 2021-02-24 DIAGNOSIS — Z17.0 MALIGNANT NEOPLASM OF CENTRAL PORTION OF RIGHT BREAST IN FEMALE, ESTROGEN RECEPTOR POSITIVE (H): ICD-10-CM

## 2021-02-24 DIAGNOSIS — Z17.0 MALIGNANT NEOPLASM OF CENTRAL PORTION OF RIGHT BREAST IN FEMALE, ESTROGEN RECEPTOR POSITIVE (H): Primary | ICD-10-CM

## 2021-02-24 DIAGNOSIS — C50.111 MALIGNANT NEOPLASM OF CENTRAL PORTION OF RIGHT BREAST IN FEMALE, ESTROGEN RECEPTOR POSITIVE (H): Primary | ICD-10-CM

## 2021-02-24 DIAGNOSIS — C50.111 MALIGNANT NEOPLASM OF CENTRAL PORTION OF RIGHT BREAST IN FEMALE, ESTROGEN RECEPTOR POSITIVE (H): ICD-10-CM

## 2021-02-24 PROCEDURE — G0463 HOSPITAL OUTPT CLINIC VISIT: HCPCS | Performed by: RADIOLOGY

## 2021-02-24 PROCEDURE — 77066 DX MAMMO INCL CAD BI: CPT | Performed by: RADIOLOGY

## 2021-02-24 PROCEDURE — 76642 ULTRASOUND BREAST LIMITED: CPT | Mod: LT | Performed by: RADIOLOGY

## 2021-02-24 RX ORDER — LIDOCAINE HYDROCHLORIDE 10 MG/ML
10 INJECTION, SOLUTION EPIDURAL; INFILTRATION; INTRACAUDAL; PERINEURAL ONCE
Status: ACTIVE | OUTPATIENT
Start: 2021-02-24

## 2021-02-24 RX ORDER — IOPAMIDOL 612 MG/ML
100 INJECTION, SOLUTION INTRATHECAL ONCE
Status: COMPLETED | OUTPATIENT
Start: 2021-02-24 | End: 2021-02-24

## 2021-02-24 RX ADMIN — IOPAMIDOL 100 ML: 612 INJECTION, SOLUTION INTRATHECAL at 13:13

## 2021-02-24 SDOH — ECONOMIC STABILITY: FOOD INSECURITY: WITHIN THE PAST 12 MONTHS, THE FOOD YOU BOUGHT JUST DIDN'T LAST AND YOU DIDN'T HAVE MONEY TO GET MORE.: NOT ASKED

## 2021-02-24 SDOH — HEALTH STABILITY: MENTAL HEALTH: HOW OFTEN DO YOU HAVE 6 OR MORE DRINKS ON ONE OCCASION?: NOT ASKED

## 2021-02-24 SDOH — ECONOMIC STABILITY: TRANSPORTATION INSECURITY
IN THE PAST 12 MONTHS, HAS LACK OF TRANSPORTATION KEPT YOU FROM MEETINGS, WORK, OR FROM GETTING THINGS NEEDED FOR DAILY LIVING?: NOT ASKED

## 2021-02-24 SDOH — ECONOMIC STABILITY: FOOD INSECURITY: WITHIN THE PAST 12 MONTHS, YOU WORRIED THAT YOUR FOOD WOULD RUN OUT BEFORE YOU GOT MONEY TO BUY MORE.: NOT ASKED

## 2021-02-24 SDOH — ECONOMIC STABILITY: INCOME INSECURITY: HOW HARD IS IT FOR YOU TO PAY FOR THE VERY BASICS LIKE FOOD, HOUSING, MEDICAL CARE, AND HEATING?: NOT ASKED

## 2021-02-24 SDOH — HEALTH STABILITY: MENTAL HEALTH: HOW MANY STANDARD DRINKS CONTAINING ALCOHOL DO YOU HAVE ON A TYPICAL DAY?: NOT ASKED

## 2021-02-24 SDOH — HEALTH STABILITY: MENTAL HEALTH: HOW OFTEN DO YOU HAVE A DRINK CONTAINING ALCOHOL?: NOT ASKED

## 2021-02-24 SDOH — ECONOMIC STABILITY: TRANSPORTATION INSECURITY
IN THE PAST 12 MONTHS, HAS THE LACK OF TRANSPORTATION KEPT YOU FROM MEDICAL APPOINTMENTS OR FROM GETTING MEDICATIONS?: NOT ASKED

## 2021-02-24 ASSESSMENT — ENCOUNTER SYMPTOMS
DEPRESSION: 1
NECK PAIN: 0
VOMITING: 0
INSOMNIA: 1
SORE THROAT: 0
NERVOUS/ANXIOUS: 1
FREQUENCY: 0
BLOOD IN STOOL: 0
DIARRHEA: 0
HEADACHES: 0
SEIZURES: 0
HEMATURIA: 0
CHILLS: 0
BLURRED VISION: 0
DOUBLE VISION: 0
FALLS: 0
CONSTIPATION: 0
FEVER: 0
TINGLING: 0
WEIGHT LOSS: 0
NAUSEA: 0
DYSURIA: 0
BACK PAIN: 0
DIAPHORESIS: 0
BRUISES/BLEEDS EASILY: 0
DIZZINESS: 0
EYE PAIN: 0
SHORTNESS OF BREATH: 0
COUGH: 0

## 2021-02-24 NOTE — PROGRESS NOTES
HPI    INITIAL PATIENT ASSESSMENT    Diagnosis: Breast cancer    Prior radiation therapy: None    Prior chemotherapy: None    Prior hormonal therapy:Yes: Yes - combi-patch x 5 years since age 51    Pain Eval:  Denies    Psychosocial  Living arrangements: 2 children  Fall Risk: independent  Falls Risk Screening Questions - Radiation consult  1. Have you fallen in the past one week?  No.  2. Have you felt unsteady when walking or standing in the past one week?  No.     referral needs: Not needed    Advanced Directive: Yes - Location: at home  Implantable Cardiac Device? No    Onset of menarche: @ age 16-17  LMP: No LMP recorded. Patient is postmenopausal.  Onset of menopause: menopause @ 54. Had an ablation so diff. To know exact timing.  Abnormal vaginal bleeding/discharge: No  Are you pregnant? No  Reproductive note: 2 children    Review of Systems   Constitutional: Negative for chills, diaphoresis, fever, malaise/fatigue and weight loss.   HENT: Negative for ear pain, nosebleeds and sore throat.    Eyes: Negative for blurred vision, double vision and pain.   Respiratory: Negative for cough and shortness of breath.    Cardiovascular: Negative for chest pain and leg swelling.   Gastrointestinal: Negative for blood in stool, constipation, diarrhea, nausea and vomiting.   Genitourinary: Negative for dysuria, frequency, hematuria and urgency.   Musculoskeletal: Negative for back pain, falls, joint pain and neck pain.   Skin: Negative for rash.   Neurological: Negative for dizziness, tingling, seizures and headaches.   Endo/Heme/Allergies: Does not bruise/bleed easily.   Psychiatric/Behavioral: Positive for depression (On prozac with good control at this time. ). Negative for suicidal ideas. The patient is nervous/anxious (With appointments) and has insomnia (Diff. staying asleep r/t waking up and worrying).         Nurse face-to-face time: Level 4:  15 min face to face time

## 2021-02-24 NOTE — LETTER
2021         RE: Jessika Hunt  [address]   [place] [address]        Dear Colleague,    Thank you for referring your patient, Jessika Hunt, to the [facility] RADIATION ONCOLOGY. Please see a copy of my visit note below.    Department of Radiation Oncology                   [place] [address]  [address] [place]  [address]  Office:  [phone]  Fax:  [phone]   Radiation Oncology Clinic  [address] [place] [address]  Phone:  [phone]  Fax:  [phone]     RE: Jessika Hunt : 1960   MRN: [id number] REYNA: 2021       OUTPATIENT VISIT NOTE       PROBLEM: early stage right breast cancer     HISTORY OF PRESENT ILLNESS:   Ms. 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  [facility] but is now receiving a second opinion at [facility].    She underwent screening mammogram on 2021 that showed an asymmetry in the right breast at the 9 o'clock position mid depth.  Diagnostic mammogram on 2021 confirmed the asymmetry and distortion. Ultrasound demonstrated a 1 x 0.9 x 0.9 cm hypoechoic mass at the 9 o'clock position 2 cm from the nipple. Right axillary ultrasound did not demonstrate lymphadenopathy.  Ultrasound-guided biopsy was performed that day with pathology ([id number]) demonstrating grade 1 invasive ductal carcinoma, ER >95% strong, PA negative, HER2 indeterminate (2+) by IHC, non-amplified by FISH. She was seen by Dr. Karla Darling in surgery at [facility] in [place] who did not detect lymphadenopathy on exam. She was also seen by genetic counseling and [facility] genetic testing was ordered and is pending. Dr. Darling discussed lumpectomy with radiation vs. mastectomy with  reconstruction.  She met with Dr. Kemp in Radiation Oncology who discussed adjuvant radiation if necessary.       She was seen by Dr. Akhtar in surgery at [facility] for a second opinion on 2/18/2021 who noted a 1.5 cm mobile rubbery left axillary lymph node.  They discussed surgical options and she was referred to radiation oncology for discussion of adjuvant therapy.     Contrast enhanced mammogram and breast ultrasound are scheduled for later today.    On interview today, patient reports she is in good health and doing well. She says she can feel a small mass when palpating her right breast. She reports no breast pain, nipple discharge, chest wall tenderness or other masses in her breast, neck or axilla.    PMH:   Past Medical History:   Diagnosis Date     Breast cancer (H) 01/2021     Melanoma of skin (H)     Lt arm   Melanoma in left arm, s/p WLE by Dr. Hutchinson in 2016    PSH:  Past Surgical History:   Procedure Laterality Date     LAPAROSCOPY DIAGNOSTIC (GYN)      ectopic   Bilateral breast augmentation in 2014    MEDICATIONS:  Current Outpatient Medications   Medication Sig Dispense Refill     BUTALBITAL-APAP-CAFF-COD PO Take 1 tablet by mouth at onset of headache        Eletriptan Hydrobromide (RELPAX PO) Take 40 mg by mouth at onset of headache        FLUoxetine HCl (PROZAC PO) Take 20 mg by mouth daily       metoclopramide (REGLAN) 5 MG tablet Take 1-2 tablets (5-10 mg) by mouth once as needed (migraine - taken with excedrin or ibuprofen) 10 tablet 0     traZODone (DESYREL) 50 MG tablet Take 50 mg by mouth At Bedtime         ALLERGY:  Allergies   Allergen Reactions     Codeine Hives       FAMILY HISTORY:  Pancreatic cancer in father diagnosed at age 79, and passed away  Father also had prostate cancer at age 78  Sister with ocular melanoma diagnosed at age 67    SOCIAL HISTORY:  Quit smoking in 2002, occasionally still smokes  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    GYN HISTORY:  LMP age 51,  HRT for 5 years after age 51    ECOG PERFORMANCE STATUS: 0    HISTORY OF RADIATION: None  IMPLANTED CARDIAC DEVICE: None  PREGNANCY RISK: Menopause at about 51 years old    REVIEW OF SYMPTOMS:  A full 10-point review of systems was performed as per nursing note.  Pertinent negatives and positives reviewed.    PHYSICAL EXAMINATION:    Gen: Alert, in NAD  Eyes: EOMI, sclera anicteric  HENT      Head: NC/AT     Ears: No external auricular lesions  Pulm: Breathing comfortably on room air, no audible wheezes or ronchi  CV: Well-perfused, no cyanosis  Abdominal: Soft, nondistended  Skin: Normal color and turgor  Neurologic/MSK: motor grossly intact, normal gait  Lymph: no axillary, infraclavicular, or supraclavicular lymphadenopathy  Breast: s/p bilateral breast augmentation. There is an approximately 1 cm mobile firm mass about 2 cm from the nipple at the 9:00 position in the right breast  Psych: Appropriate mood and affect    ASSESSMENT AND PLAN: Ms. 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    We discussed the therapeutic options available to the patient based on the size, location and pathology from biopsy of the lesion; desired cosmetic outcomes; and risk of local recurrence after surgical excision. We recommended either mastectomy with immediate reconstruction or lumpectomy with adjuvant radiotherapy for equivalent cancer outcomes. It is unlikely but possible she may need post-mastectomy radiation therapy depending on pathology at the time of surgery, such as finding of a positive lymph node or less likely positive surgical margin. We reviewed the available evidence for cosmetic outcomes for patients who have  had breast augmentation prior to undergoing radiotherapy, with older studies showing capsular contracture rates in excess of 50-60% and more recent study showing approximately 25-30%.     Genetic testing is pending, which may also guide the patient's decision-making.    We discussed the benefits of adjuvant radiotherapy after lumpectomy. We mentioned that local recurrence rates are significantly higher if lumpectomy is performed without subsequent radiation. The patient is aware that depending on the margins and karina status post-mastectomy, adjuvant radiation may be necessary in that situation. We discussed the common side effects of radiotherapy, including skin changes and tissue fibrosis, as well as rare cardiac and pulmonary effects.    Plan:  - Bilateral contrast mammogram per surgical oncology.  - Patient plans to meet with plastic surgery at [facility] for a second opinion.  - Follow-up with patient depending on her chosen course and location of care    Thank you for allowing us to participate in this patient's care.  Please feel free to call with any questions or concerns.    The patient was seen and assessed with staff, Dr. John, who agrees with the above assessment and plan.      Delphine Campos MS-4     Ovidio Salazar MD PGY-4  Radiation Oncology, [facility]  [phone] clinic  Pager [phone]           I saw and examined the patient with the resident.  I have reviewed and edited the resident's note and agree with the plan of care.      I reviewed patient's chart, internal/external medical records, imaging studies (including actual images), labs and pathology reports.  I interviewed and counseled the patient face to face.  I additionally discussed the case with patient's referring physicians and care team.     80 minutes were spent on the date of the encounter doing chart review, history and exam, documentation and further activities as noted above.     Zhao John,  MD        [facility]    INITIAL PATIENT ASSESSMENT    Diagnosis: Breast cancer    Prior radiation therapy: None    Prior chemotherapy: None    Prior hormonal therapy:Yes: Yes - combi-patch x 5 years since age 51    Pain Eval:  Denies    Psychosocial  Living arrangements: 2 children  Fall Risk: independent  Falls Risk Screening Questions - Radiation consult  1. Have you fallen in the past one week?  No.  2. Have you felt unsteady when walking or standing in the past one week?  No.     referral needs: Not needed    Advanced Directive: Yes - Location: at home  Implantable Cardiac Device? No    Onset of menarche: @ age 16-17  LMP: No LMP recorded. Patient is postmenopausal.  Onset of menopause: menopause @ 54. Had an ablation so diff. To know exact timing.  Abnormal vaginal bleeding/discharge: No  Are you pregnant? No  Reproductive note: 2 children    Review of Systems   Constitutional: Negative for chills, diaphoresis, fever, malaise/fatigue and weight loss.   HENT: Negative for ear pain, nosebleeds and sore throat.    Eyes: Negative for blurred vision, double vision and pain.   Respiratory: Negative for cough and shortness of breath.    Cardiovascular: Negative for chest pain and leg swelling.   Gastrointestinal: Negative for blood in stool, constipation, diarrhea, nausea and vomiting.   Genitourinary: Negative for dysuria, frequency, hematuria and urgency.   Musculoskeletal: Negative for back pain, falls, joint pain and neck pain.   Skin: Negative for rash.   Neurological: Negative for dizziness, tingling, seizures and headaches.   Endo/Heme/Allergies: Does not bruise/bleed easily.   Psychiatric/Behavioral: Positive for depression (On prozac with good control at this time. ). Negative for suicidal ideas. The patient is nervous/anxious (With appointments) and has insomnia (Diff. staying asleep r/t waking up and worrying).       Nurse face-to-face time: Level 4:  15 min face to face time    Again, thank you for  allowing me to participate in the care of your patient.      Sincerely,        Zhao John MD

## 2021-02-25 ENCOUNTER — COMMUNICATION - HEALTHEAST (OUTPATIENT)
Dept: RADIATION ONCOLOGY | Facility: HOSPITAL | Age: 61
End: 2021-02-25

## 2021-02-25 ENCOUNTER — COMMUNICATION - HEALTHEAST (OUTPATIENT)
Dept: SURGERY | Facility: CLINIC | Age: 61
End: 2021-02-25

## 2021-02-25 ENCOUNTER — TELEPHONE (OUTPATIENT)
Dept: SURGERY | Facility: CLINIC | Age: 61
End: 2021-02-25

## 2021-02-25 NOTE — TELEPHONE ENCOUNTER
PREVISIT INFORMATION                                                    Jessika Hunt scheduled for future visit at [facility] specialty clinics.    Patient is scheduled to see Dr. Torres on 2/26/21  Reason for visit: Breast reconstruction  Referring provider Lorenza Akhtar MD  Has patient seen previous specialist? No  Medical Records:  Available in chart.  Patient was previously seen at a [facility] or [facility] facility.    REVIEW                                                      New patient packet mailed to patient: No  Medication reconciliation complete: No      Current Outpatient Medications   Medication Sig Dispense Refill     BUTALBITAL-APAP-CAFF-COD PO Take 1 tablet by mouth at onset of headache        Eletriptan Hydrobromide (RELPAX PO) Take 40 mg by mouth at onset of headache        FLUoxetine HCl (PROZAC PO) Take 20 mg by mouth daily       metoclopramide (REGLAN) 5 MG tablet Take 1-2 tablets (5-10 mg) by mouth once as needed (migraine - taken with excedrin or ibuprofen) 10 tablet 0     traZODone (DESYREL) 50 MG tablet Take 50 mg by mouth At Bedtime         Allergies: Codeine        PLAN/FOLLOW-UP NEEDED                                                      Previsit review complete.  Patient will see provider at future scheduled appointment.     Patient Reminders Given:  Please, make sure you bring an updated list of your medications.   If you are having a procedure, please, present 15 minutes early.  If you need to cancel or reschedule,please call [phone].    Shannan Mary LPN

## 2021-02-26 ENCOUNTER — OFFICE VISIT (OUTPATIENT)
Dept: SURGERY | Facility: CLINIC | Age: 61
End: 2021-02-26
Payer: COMMERCIAL

## 2021-02-26 VITALS — WEIGHT: 113.6 LBS | BODY MASS INDEX: 20.13 KG/M2 | HEIGHT: 63 IN

## 2021-02-26 DIAGNOSIS — C50.911 MALIGNANT NEOPLASM OF RIGHT FEMALE BREAST, UNSPECIFIED ESTROGEN RECEPTOR STATUS, UNSPECIFIED SITE OF BREAST (H): Primary | ICD-10-CM

## 2021-02-26 PROCEDURE — 99203 OFFICE O/P NEW LOW 30 MIN: CPT | Performed by: PLASTIC SURGERY

## 2021-02-26 RX ORDER — CEFAZOLIN SODIUM 2 G/50ML
2 SOLUTION INTRAVENOUS
Status: CANCELLED | OUTPATIENT
Start: 2021-02-26

## 2021-02-26 RX ORDER — CEFAZOLIN SODIUM 2 G/50ML
2 SOLUTION INTRAVENOUS SEE ADMIN INSTRUCTIONS
Status: CANCELLED | OUTPATIENT
Start: 2021-02-26

## 2021-02-26 ASSESSMENT — PAIN SCALES - GENERAL: PAINLEVEL: NO PAIN (0)

## 2021-02-26 ASSESSMENT — MIFFLIN-ST. JEOR: SCORE: 1054.42

## 2021-02-26 NOTE — NURSING NOTE
Per Dr. Torres bilateral breast pictures to be taken. Pictures taken facing forward, 45 and 90 degree angle on both sides.    Shannan Mary LPN

## 2021-02-26 NOTE — PROGRESS NOTES
REFERRING PHYSICIAN:  Dr. Lorenza Akhtar, [facility] Surgery.      PRESENTING COMPLAINT:  Consultation for breast reconstruction.      HISTORY OF PRESENTING COMPLAINT:  Ms. Hunt is 61 years old.  She has been diagnosed with right-sided breast cancer.  She is contemplating a bilateral mastectomy, interested in reconstruction.  It is important to note, the patient had breast augmentation done 7 years ago without any mastopexy.  The implant was a 250 mL silicone implant placed through the inframammary fold underneath the muscle.  Overall, the patient is extremely happy with the aesthetics of her breasts and would like to recreate her current breast size if at all possible.  Otherwise, she would be okay with being a little smaller, up to about a C cup.  She wears a D to DD bra right now.  No chemotherapy is contemplated.  She is unlikely to get radiation therapy if she undergoes mastectomies.      PAST MEDICAL HISTORY:  Migraines.      PAST SURGICAL HISTORY:  Diagnostic laparoscopy, breast augmentation and Mohs surgery.      MEDICATIONS:  Butalbital, Relpax, fluoxetine and trazodone.      ALLERGIES:  Codeine.      SOCIAL HISTORY:  Used to socially smoke for about 20 years 30 years ago.  Drinks socially.  Lives in [place], is a PCA.      REVIEW OF SYSTEMS:  Denies chest pain, shortness of breath, MI, CVA, DVT and PE.      PHYSICAL EXAMINATION:  Vital signs are stable.  She is afebrile, in no obvious distress.  She is 5 feet 3 inches, 113 pounds, BMI of 20 kg/m2.  On examination of her breasts, she has grade 0 ptosis.  Right breast slightly larger than the left, aesthetic breasts.  Inframammary fold incisions.  Not enough tissue in her abdomen to give her the size that she wants.      ASSESSMENT AND PLAN:  Based on above findings, a diagnosis of right breast cancer in the background of a breast augmentation with non-ptotic breasts was made.  The patient is interested in immediate reconstruction.  Given the patient's findings as  well as her requests, we did discuss overall concepts behind breast reconstruction, immediate versus delayed reconstruction, staged nature of the reconstruction, implant-based versus autologous reconstruction.  She is not a candidate for autologous given the request that she has.  I think the best course of action is to proceed with either a direct-to-implant reconstruction or an expander-based reconstruction followed by implants.  The question is whether the nipples can stay from a cancer standpoint or not.  From an aesthetic standpoint, her best aesthetic outcome would be a nipple-sparing mastectomy either through an inframammary fold, lateral mammary fold or a radial incision, followed by either direct-to-implant or expander-based reconstruction.  If a nipple non-sparing mastectomy is undertaken, I still think the patient will get a direct-to-implant reconstruction as long as she is okay being about 50%-60%, the size she currently is, which she is.  I did discuss with the patient that blood flow to the skin flaps has to be excellent for me to do a direct-to-implant reconstruction.  If not, then we would plan to do an expander-based reconstruction or delay her reconstruction if the blood flow is poorer still.  The concept behind a SPY angiogram was explained.  The use of acellular dermal matrix in the face of a direct-to-implant was explained.  All questions were answered.  Risks of pain, infection, bleeding, scarring, asymmetry, seromas, hematomas, wound breakdown, wound dehiscence, implant problems like visibility, palpability, rippling, capsular contracture, animation defect, especially if we go submuscular, contour irregularities requiring a second stage, DVT, PE, MI, CVA, pneumonia, renal failure and death were all explained.  Numbness of the chest and breast were also explained.  The differences between subcutaneous and submuscular placement of implants were also explained.  Animation defect versus an upper  chintan were explained.  She understood everything.  I will discuss her case with Dr. Akhtar and then proceed as indicated.  All questions were answered.  She was happy with the visit.  Consent obtained.  Photographs obtained.      Total time spent with pre-visit chart review, the visit itself and post-visit paperwork was 30 minutes.      cc:   Lorenza Akhtar MD    [facility] - Surgery   [address] [place] [address], [address]  [id number]

## 2021-02-26 NOTE — NURSING NOTE
Jessika Hnut's goals for this visit include:   Chief Complaint   Patient presents with     Consult     breast reconstruction RP         She requests these members of her care team be copied on today's visit information:     PCP: Melody Rubalcava    Referring Provider:  No referring provider defined for this encounter.    There were no vitals taken for this visit.    Do you need any medication refills at today's visit? N/a..Irina Miranda RN

## 2021-02-26 NOTE — LETTER
2/26/2021         RE: Jessika Hunt  [address]   [place] [address]        Dear Colleague,    Thank you for referring your patient, Jessika Hunt, to the [facility]. Please see a copy of my visit note below.    REFERRING PHYSICIAN:  Dr. Lorenza Akhtar, [facility] Surgery.      PRESENTING COMPLAINT:  Consultation for breast reconstruction.      HISTORY OF PRESENTING COMPLAINT:  Ms. Hunt is 61 years old.  She has been diagnosed with right-sided breast cancer.  She is contemplating a bilateral mastectomy, interested in reconstruction.  It is important to note, the patient had breast augmentation done 7 years ago without any mastopexy.  The implant was a 250 mL silicone implant placed through the inframammary fold underneath the muscle.  Overall, the patient is extremely happy with the aesthetics of her breasts and would like to recreate her current breast size if at all possible.  Otherwise, she would be okay with being a little smaller, up to about a C cup.  She wears a D to DD bra right now.  No chemotherapy is contemplated.  She is unlikely to get radiation therapy if she undergoes mastectomies.      PAST MEDICAL HISTORY:  Migraines.      PAST SURGICAL HISTORY:  Diagnostic laparoscopy, breast augmentation and Mohs surgery.      MEDICATIONS:  Butalbital, Relpax, fluoxetine and trazodone.      ALLERGIES:  Codeine.      SOCIAL HISTORY:  Used to socially smoke for about 20 years 30 years ago.  Drinks socially.  Lives in [place], is a PCA.      REVIEW OF SYSTEMS:  Denies chest pain, shortness of breath, MI, CVA, DVT and PE.      PHYSICAL EXAMINATION:  Vital signs are stable.  She is afebrile, in no obvious distress.  She is 5 feet 3 inches, 113 pounds, BMI of 20 kg/m2.  On examination of her breasts, she has grade 0 ptosis.  Right breast slightly larger than the left, aesthetic breasts.  Inframammary fold incisions.  Not enough tissue in her abdomen to give her the size that she wants.       ASSESSMENT AND PLAN:  Based on above findings, a diagnosis of right breast cancer in the background of a breast augmentation with non-ptotic breasts was made.  The patient is interested in immediate reconstruction.  Given the patient's findings as well as her requests, we did discuss overall concepts behind breast reconstruction, immediate versus delayed reconstruction, staged nature of the reconstruction, implant-based versus autologous reconstruction.  She is not a candidate for autologous given the request that she has.  I think the best course of action is to proceed with either a direct-to-implant reconstruction or an expander-based reconstruction followed by implants.  The question is whether the nipples can stay from a cancer standpoint or not.  From an aesthetic standpoint, her best aesthetic outcome would be a nipple-sparing mastectomy either through an inframammary fold, lateral mammary fold or a radial incision, followed by either direct-to-implant or expander-based reconstruction.  If a nipple non-sparing mastectomy is undertaken, I still think the patient will get a direct-to-implant reconstruction as long as she is okay being about 50%-60%, the size she currently is, which she is.  I did discuss with the patient that blood flow to the skin flaps has to be excellent for me to do a direct-to-implant reconstruction.  If not, then we would plan to do an expander-based reconstruction or delay her reconstruction if the blood flow is poorer still.  The concept behind a SPY angiogram was explained.  The use of acellular dermal matrix in the face of a direct-to-implant was explained.  All questions were answered.  Risks of pain, infection, bleeding, scarring, asymmetry, seromas, hematomas, wound breakdown, wound dehiscence, implant problems like visibility, palpability, rippling, capsular contracture, animation defect, especially if we go submuscular, contour irregularities requiring a second stage, DVT,  PE, MI, CVA, pneumonia, renal failure and death were all explained.  Numbness of the chest and breast were also explained.  The differences between subcutaneous and submuscular placement of implants were also explained.  Animation defect versus an upper ledge were explained.  She understood everything.  I will discuss her case with Dr. Akhtar and then proceed as indicated.  All questions were answered.  She was happy with the visit.  Consent obtained.  Photographs obtained.      Total time spent with pre-visit chart review, the visit itself and post-visit paperwork was 30 minutes.      cc:   Lorenza Akhtar MD    [facility] - Surgery   [address] [place] [address], [address]  [id number]           Again, thank you for allowing me to participate in the care of your patient.        Sincerely,        GAIL Torres MD

## 2021-03-01 ENCOUNTER — PATIENT OUTREACH (OUTPATIENT)
Dept: SURGERY | Facility: CLINIC | Age: 61
End: 2021-03-01

## 2021-03-01 ENCOUNTER — COMMUNICATION - HEALTHEAST (OUTPATIENT)
Dept: ONCOLOGY | Facility: HOSPITAL | Age: 61
End: 2021-03-01

## 2021-03-01 LAB — COPATH REPORT: NORMAL

## 2021-03-01 NOTE — TELEPHONE ENCOUNTER
Surgical Oncology RN Care Coordination Note:     Called patient to check in and see if she has made decision regarding surgical plans. Per patient she is still meeting with her original surgeon and deciding on where she wants to have surgery. Informed her that we will have to release tentative surgery date by end of the day tomorrow to other patients and that if she decides to proceed with care here we will then need to look for soonest option with team for scheduling. She verbalized understanding and stated she would plan to follow up by phone by the end of the day.     Evonne Edward RN, BSN  Care Coordinator

## 2021-03-02 ENCOUNTER — AMBULATORY - HEALTHEAST (OUTPATIENT)
Dept: SURGERY | Facility: CLINIC | Age: 61
End: 2021-03-02

## 2021-03-02 ENCOUNTER — SURGERY - HEALTHEAST (OUTPATIENT)
Dept: SURGERY | Facility: CLINIC | Age: 61
End: 2021-03-02

## 2021-03-02 ENCOUNTER — COMMUNICATION - HEALTHEAST (OUTPATIENT)
Dept: SURGERY | Facility: CLINIC | Age: 61
End: 2021-03-02

## 2021-03-02 DIAGNOSIS — C50.911 INVASIVE DUCTAL CARCINOMA OF BREAST, RIGHT (H): ICD-10-CM

## 2021-03-02 DIAGNOSIS — Z11.59 ENCOUNTER FOR SCREENING FOR OTHER VIRAL DISEASES: ICD-10-CM

## 2021-03-03 ENCOUNTER — RECORDS - HEALTHEAST (OUTPATIENT)
Dept: LAB | Facility: CLINIC | Age: 61
End: 2021-03-03

## 2021-03-03 ENCOUNTER — RECORDS - HEALTHEAST (OUTPATIENT)
Dept: ADMINISTRATIVE | Facility: OTHER | Age: 61
End: 2021-03-03

## 2021-03-03 LAB
ERYTHROCYTE [DISTWIDTH] IN BLOOD BY AUTOMATED COUNT: 13.1 % (ref 11–14.5)
HCT VFR BLD AUTO: 40.2 % (ref 35–47)
HGB BLD-MCNC: 13.6 G/DL (ref 12–16)
MCH RBC QN AUTO: 31.9 PG (ref 27–34)
MCHC RBC AUTO-ENTMCNC: 33.8 G/DL (ref 32–36)
MCV RBC AUTO: 94 FL (ref 80–100)
PLATELET # BLD AUTO: 251 THOU/UL (ref 140–440)
PMV BLD AUTO: 11.2 FL (ref 8.5–12.5)
RBC # BLD AUTO: 4.27 MILL/UL (ref 3.8–5.4)
WBC: 6.2 THOU/UL (ref 4–11)

## 2021-03-04 ENCOUNTER — COMMUNICATION - HEALTHEAST (OUTPATIENT)
Dept: SURGERY | Facility: CLINIC | Age: 61
End: 2021-03-04

## 2021-03-08 ENCOUNTER — HOSPITAL ENCOUNTER (OUTPATIENT)
Dept: SURGERY | Facility: CLINIC | Age: 61
Discharge: HOME | End: 2021-03-08
Attending: SURGERY

## 2021-03-08 DIAGNOSIS — C50.911 INVASIVE DUCTAL CARCINOMA OF BREAST, RIGHT (H): ICD-10-CM

## 2021-03-08 ASSESSMENT — MIFFLIN-ST. JEOR: SCORE: 1045.34

## 2021-03-09 ENCOUNTER — AMBULATORY - HEALTHEAST (OUTPATIENT)
Dept: LAB | Facility: CLINIC | Age: 61
End: 2021-03-09

## 2021-03-09 DIAGNOSIS — Z11.59 ENCOUNTER FOR SCREENING FOR OTHER VIRAL DISEASES: ICD-10-CM

## 2021-03-09 LAB
SARS-COV-2 PCR COMMENT: NORMAL
SARS-COV-2 RNA SPEC QL NAA+PROBE: NEGATIVE
SARS-COV-2 VIRUS SPECIMEN SOURCE: NORMAL

## 2021-03-10 ENCOUNTER — ANESTHESIA - HEALTHEAST (OUTPATIENT)
Dept: SURGERY | Facility: HOSPITAL | Age: 61
End: 2021-03-10

## 2021-03-10 ENCOUNTER — COMMUNICATION - HEALTHEAST (OUTPATIENT)
Dept: SCHEDULING | Facility: CLINIC | Age: 61
End: 2021-03-10

## 2021-03-11 ENCOUNTER — SURGERY - HEALTHEAST (OUTPATIENT)
Dept: SURGERY | Facility: HOSPITAL | Age: 61
End: 2021-03-11

## 2021-03-11 ENCOUNTER — DOCUMENTATION ONLY (OUTPATIENT)
Dept: OTHER | Facility: CLINIC | Age: 61
End: 2021-03-11

## 2021-03-11 ENCOUNTER — AMBULATORY - HEALTHEAST (OUTPATIENT)
Dept: OTHER | Facility: CLINIC | Age: 61
End: 2021-03-11

## 2021-03-12 ENCOUNTER — COMMUNICATION - HEALTHEAST (OUTPATIENT)
Dept: SURGERY | Facility: CLINIC | Age: 61
End: 2021-03-12

## 2021-03-15 ENCOUNTER — AMBULATORY - HEALTHEAST (OUTPATIENT)
Dept: SURGERY | Facility: CLINIC | Age: 61
End: 2021-03-15

## 2021-03-15 DIAGNOSIS — C50.911 INVASIVE DUCTAL CARCINOMA OF BREAST, RIGHT (H): ICD-10-CM

## 2021-03-23 ENCOUNTER — RECORDS - HEALTHEAST (OUTPATIENT)
Dept: ADMINISTRATIVE | Facility: OTHER | Age: 61
End: 2021-03-23

## 2021-03-24 ENCOUNTER — HOSPITAL ENCOUNTER (OUTPATIENT)
Dept: SURGERY | Facility: CLINIC | Age: 61
Discharge: HOME | End: 2021-03-24
Attending: SURGERY

## 2021-03-24 DIAGNOSIS — C50.911 INVASIVE DUCTAL CARCINOMA OF BREAST, STAGE 1, RIGHT (H): ICD-10-CM

## 2021-03-25 ENCOUNTER — COMMUNICATION - HEALTHEAST (OUTPATIENT)
Dept: SURGERY | Facility: CLINIC | Age: 61
End: 2021-03-25

## 2021-03-25 ENCOUNTER — AMBULATORY - HEALTHEAST (OUTPATIENT)
Dept: SURGERY | Facility: CLINIC | Age: 61
End: 2021-03-25

## 2021-03-26 ENCOUNTER — COMMUNICATION - HEALTHEAST (OUTPATIENT)
Dept: ONCOLOGY | Facility: HOSPITAL | Age: 61
End: 2021-03-26

## 2021-03-29 ENCOUNTER — AMBULATORY - HEALTHEAST (OUTPATIENT)
Dept: SURGERY | Facility: CLINIC | Age: 61
End: 2021-03-29

## 2021-03-29 ENCOUNTER — COMMUNICATION - HEALTHEAST (OUTPATIENT)
Dept: ONCOLOGY | Facility: HOSPITAL | Age: 61
End: 2021-03-29

## 2021-04-12 ENCOUNTER — RECORDS - HEALTHEAST (OUTPATIENT)
Dept: ADMINISTRATIVE | Facility: OTHER | Age: 61
End: 2021-04-12

## 2021-04-18 ENCOUNTER — HEALTH MAINTENANCE LETTER (OUTPATIENT)
Age: 61
End: 2021-04-18

## 2021-04-26 LAB
LAB AP CHARGES (HE HISTORICAL CONVERSION): ABNORMAL
LAB AP FISH HER2/NEU REPORT,ADDENDUM (HE HISTORICAL CONVERSION): ABNORMAL
PATH REPORT.ADDENDUM SPEC: ABNORMAL
PATH REPORT.COMMENTS IMP SPEC: ABNORMAL
PATH REPORT.COMMENTS IMP SPEC: ABNORMAL
PATH REPORT.FINAL DX SPEC: ABNORMAL
PATH REPORT.GROSS SPEC: ABNORMAL
PATH REPORT.MICROSCOPIC SPEC OTHER STN: ABNORMAL
PATH REPORT.MICROSCOPIC SPEC OTHER STN: ABNORMAL
PATH REPORT.RELEVANT HX SPEC: ABNORMAL
RESULT FLAG (HE HISTORICAL CONVERSION): ABNORMAL

## 2021-05-24 ENCOUNTER — RECORDS - HEALTHEAST (OUTPATIENT)
Dept: ADMINISTRATIVE | Facility: CLINIC | Age: 61
End: 2021-05-24

## 2021-05-25 ENCOUNTER — RECORDS - HEALTHEAST (OUTPATIENT)
Dept: ADMINISTRATIVE | Facility: CLINIC | Age: 61
End: 2021-05-25

## 2021-05-26 ENCOUNTER — RECORDS - HEALTHEAST (OUTPATIENT)
Dept: ADMINISTRATIVE | Facility: CLINIC | Age: 61
End: 2021-05-26

## 2021-05-27 ENCOUNTER — RECORDS - HEALTHEAST (OUTPATIENT)
Dept: ADMINISTRATIVE | Facility: CLINIC | Age: 61
End: 2021-05-27

## 2021-05-28 ENCOUNTER — RECORDS - HEALTHEAST (OUTPATIENT)
Dept: ADMINISTRATIVE | Facility: CLINIC | Age: 61
End: 2021-05-28

## 2021-05-29 NOTE — PROGRESS NOTES
Injection - Other  Date/Time: 6/5/2019 9:15 AM  Performed by: Sam Mary MD  Authorized by: Sam Mary MD   Comments:     TRIGGER POINT INJECTION  Performed on: 6/5/2019    Ms. Hunt is a 59-year-old woman with myofascial pain in the neck and shoulders.  She also has migraine headaches and gets Botox injections for this.  Her last Botox injections were about 6 months ago.  Today she feels the neck and shoulders are bothering her more.  If the migraines increase in intensity she will return for the Botox injections.  Today we will repeat trigger point injections which helped the most for her neck and shoulders.    Pre Procedure Diagnosis:  Myofascial pain  Vital Signs:  As per intra-procedure documentation    The procedure was discussed with Jessika Hunt in detail along with the attendant risks, including but not limited to: nerve injury, infection, bleeding, allergic reaction, or worsening of pain.  Informed consent was obtained and patient elected to proceed.    After informed consent was obtained the patient was seated in the examination chair.  The neck and shoulder areas were prepped sterilely with alcohol.  A 1-1/4 inch number 27-gauge needle was used.  There were injections made in the levator scapula muscles near the attachment to the scapula on both sides.  Injections were made into the splenius capitis muscle bilaterally.  There was also an injection in the left teres major muscle.  A total of 8 mL of 0.25% Marcaine with 8 mg of Decadron was used in divided doses.    The patient tolerated the procedure well.  There were no complications.      Pre Procedure Pain Scale: 6  Pain Score:   5    If Jessika Hunt has any questions or concerns after discharge, she was instructed to call us.

## 2021-05-29 NOTE — PATIENT INSTRUCTIONS - HE
POST PROCEDURE INSTRUCTIONS  PROCEDURE DONE TODAY:TRIGGER POINT INJECTION    Rest today. Resume activity tomorrow as able.  Patient demonstrates the ability to ambulate independently with a steady gait at the time of discharge.      It is not unusual to have a temporary increase in your pain after a procedure. You can ice the area 24-48 hrs. 15 min at a time.      You received a steroid injection. This medication can take 2-7 days to take effect. Some temporary side effects include:    Heartburn    Flushed-red face    Insomnia-trouble sleeping-jitters    Diabetics may see a rise in blood sugar for a few days    Low back or SI joint (sacroiliac) injection: you may experience numbness in one or both legs. Please walk with help. This is temporary and will wear off in a few hours. Do not drive if you are tingling, numbness or weakness in your hands/arms or legs/feet.    Headache:  If you experience a headache after a lumbar epidural injection, lie down and drink fluids (especially caffeine). The headache will go away gradually. If it persists notify our clinic.    Fever: call if fever 100 or over, redness/warmth/swelling over the injection site.    No public hot tubs/pools for a couple days    Physical therapy: check with pain center provider regarding resuming therapy.    Monitor your response to the injection and report this at your next visit.    If you have been referred for a procedure only, please call your referring provider for a follow up.    IF YOU PLAN TO GET A FLU SHOT YOU MUST WAIT 2 WEEKS AFTER THIS INJECTION.      CALL IF ANY QUESTIONS [phone]

## 2021-05-30 VITALS — BODY MASS INDEX: 19.49 KG/M2 | HEIGHT: 63 IN | WEIGHT: 110 LBS

## 2021-05-31 VITALS — WEIGHT: 110 LBS | BODY MASS INDEX: 19.49 KG/M2 | HEIGHT: 63 IN

## 2021-06-01 VITALS — WEIGHT: 114 LBS | BODY MASS INDEX: 20.2 KG/M2 | HEIGHT: 63 IN

## 2021-06-02 VITALS — HEIGHT: 63 IN | WEIGHT: 114 LBS | BODY MASS INDEX: 20.2 KG/M2

## 2021-06-03 VITALS — HEIGHT: 63 IN | WEIGHT: 114 LBS | BODY MASS INDEX: 20.2 KG/M2

## 2021-06-03 VITALS — WEIGHT: 114 LBS | HEIGHT: 63 IN | BODY MASS INDEX: 20.2 KG/M2

## 2021-06-03 NOTE — PROGRESS NOTES
Injection - Other  Date/Time: 11/13/2019 9:40 AM  Performed by: Sam Mary MD  Authorized by: Sam Mary MD   Comments:     BOTOX INJECTION FOR CHRONIC INTRACTABLE MIGRAINE HEADACHES  Performed on: 11/13/2019    Ms. Hunt is a 59-year-old woman with a history of chronic intractable migraine headaches.  The headaches seem to begin with the increase in the neck muscle tone and posterior head and then radiate somewhat forward.  Botox injections of seem to given her good relief.  Her last set of injections was about 5 months ago.  She is here to repeat the injections.    Pre Procedure Diagnosis:  Chronic Intractable Migraine Headaches  Vital Signs:  As per intra-procedure documentation    The procedure was discussed with Jessika Hunt in detail along with the attendant risks, including but not limited to: nerve injury, infection, bleeding, allergic reaction, or worsening of pain.  Informed consent was obtained and patient elected to proceed.    After informed consent was obtained, the patient was seated in the examination chair.  The forehead, temples occipital and cervical areas were prepped sterily with alcohol.  A one inch #30 gauge needle was used.  Botox, 160 units, was diluted with 2 ml of normal saline.    Injections were made in:    Occipitalis muscle bilateral - 10 units in 2 sites  Temporalis muscle bilateral - 50 units in 10 sites  Posterior auricular muscle bilateral - 10 units in 2 sites  Upper sternocleidomastoid muscles bilateral - 20 units in 2 sites  Upper cervical paraspinal muscles bilateral -  10 units in 2 sites  Upper trapezius muscles bilateral - 20 units in 4 sites  Levator scapula muscles bilateral - 20 units in 4 sites  Rhomboid muscles bilateral - 20 units in 2 sites     The 160 units total were injected in divided doses.      The patient tolerated the procedure well.  There were no complications.      If Jessika Hunt has any questions or concerns after  discharge, she was instructed to call us.

## 2021-06-03 NOTE — PATIENT INSTRUCTIONS - HE
Today you received a BOTOX INJECTION.    Botox is an injectable drug made from a toxic bacterium called Clostridium botulinum.    When injected in small amounts it blocks certain chemical signals from your nerves, causing temporary paralysis of your muscles.  Botox treatments can be administered every 3 months.  It may take as long as 10 to 14 days for you to experience relief.  Complications and side effects of Botox treatments are rare.  The injections themselves are almost painless.  You may experience a very small sting with each injection.  The most common side effects of Botox injections are     neck pain and stiffness at the injection site.      You may develop a headache afterward.      You may also experience temporary muscle weakness in your neck and upper shoulders.    Bruising, bleeding, pain,redness, or swelling where the injection was given.    Low grade fever, cough, sore throat,runny nose, mild flu like symptoms, dizziness, drowsiness, tired feeling.    If these symptoms occur they can last 2 to 3 days.  You may treat them with over the counter medication; use as directed.  If these symptoms persist for more than 4 to 5 days call the Pain Center at [phone]

## 2021-06-05 VITALS — HEIGHT: 63 IN | WEIGHT: 111.6 LBS | BODY MASS INDEX: 19.77 KG/M2

## 2021-06-05 VITALS — WEIGHT: 111 LBS | HEIGHT: 63 IN | BODY MASS INDEX: 19.67 KG/M2

## 2021-06-09 NOTE — PROGRESS NOTES
Injection - Other  Date/Time: 2/21/2017 10:00 AM  Performed by: EUNICE MAGANA  Authorized by: EUNICE MAGANA   Comments:     BOTOX INJECTION FOR CHRONIC MUSCLE SPASM AND HEADACHES  Performed on: 2/21/2017    Ms. Hunt is a 56 year old woman with intractable muscle spasms in the neck and shoulders causing headaches.  Botox injections give her good relief of the pain for up to three months.  It has been three months and she is here today to repeat the injections.    Pre Procedure Diagnosis:  Chronic Intractable Migraine Headaches  Vital Signs:  As per intra-procedure documentation    The procedure was discussed with Jessika Hunt in detail along with the attendant risks, including but not limited to: nerve injury, infection, bleeding, allergic reaction, or worsening of pain.  Informed consent was obtained and patient elected to proceed.    After informed consent was obtained, the patient was seated in the examination chair.  The neck and shoulders were prepped sterilely with alcohol.  A 1 inch #30 gauge needle was used.  There were injections made in the upper sternocleidomastoid muscles (20 units each) near the attachment to the mastoid process.on both sides.  Injections were made in the lower cervical paraspinal muscles bilaerally (10 units each).  Injections were then made in the upper trapezius muscles and levator scapula (20 units each) bilaterally.  There were injections made in the rhomboid muscles,(10 units on the left and 10 units x3 on the right).  A total of 160 units of botox was dissolved in 2 ml of normal saline and injected in divided doses.    The patient tolerated the procedure well.  The patient was taken to the recovery area after the injection.  There were no complications.      If Jessika Hunt has any questions or concerns after discharge, she was instructed to call us.

## 2021-06-10 NOTE — PROGRESS NOTES
Injection - Other  Date/Time: 5/23/2017 12:00 PM  Performed by: EUNICE MAGANA  Authorized by: EUNICE MAGANA   Comments:     TRIGGER POINT INJECTION  Performed on: 5/23/2017    Ms. Hunt is a 57 year old woman who has neck and shoulder pain as well as chronic intractable migraine headaches.  She does get Botox injection for the headaches which gives her some significant relief.  Today, however, she is having more pain in the neck and shoulder areas.  We will give a trial of triggerpoint injections.    Pre Procedure Diagnosis:  Myofascial pain  Vital Signs:  As per intra-procedure documentation    The procedure was discussed with Jessika Hunt in detail along with the attendant risks, including but not limited to: nerve injury, infection, bleeding, allergic reaction, or worsening of pain.  Informed consent was obtained and patient elected to proceed.    After informed consent was obtained the patient was seated in the examination chair.  The neck and shoulder areas were prepped sterilely with alcohol.  A 1 inch #30-gauge needle was used.  There were trigger points injected in the levator scapula muscles near the attachment to the scapula on both sides.  There are also trigger points injected in the sternocleidomastoid muscles near the attachment to the mastoid on both sides.  A total of 4 mL of 0.25% Marcaine with 10 mg of Decadron was used in divided doses.    The patient tolerated the procedure well.  The patient was taken to the recovery area after the injection.  There were no complications.      Pre Procedure Pain Scale: 4  Pain Score:   4 (POST PROCEDURE VITALS AND PAIN. TPI TO BOTH SIDES OF NECK AND TRAPEZIUS)    If Jessika Hunt has any questions or concerns after discharge, she was instructed to call us.

## 2021-06-13 NOTE — PROGRESS NOTES
Injection - Other  Date/Time: 9/26/2017 11:05 AM  Performed by: EUNICE MAGANA  Authorized by: EUNICE MAGANA   Comments:     TRIGGER POINT INJECTION  Performed on: 9/26/2017    Ms. Hunt is a 57-year-old woman who has myofascial pain involving her neck and shoulders.  She last had trigger point injections about 4 months ago.  More recently the pain has increased significantly and she wishes to repeat the injections.    Pre Procedure Diagnosis:  Myofascial pain  Vital Signs:  As per intra-procedure documentation    The procedure was discussed with Jessika Hunt in detail along with the attendant risks, including but not limited to: nerve injury, infection, bleeding, allergic reaction, or worsening of pain.  Informed consent was obtained and patient elected to proceed.    After informed consent was obtained the patient was seated in the examination chair.  The neck and shoulder areas were prepped sterilely.  A 1 1/4 inch #27-gauge needle was used.  There were injections made in the upper portion of the sternocleidomastoid muscle bilaterally near the attachment to the mastoid.  Triggerpoints were also injected in the upper trapezius muscles and levator scapula muscles bilaterally.  There are also trigger points injected in the rhomboid muscles on both sides.  A total of 10 mL of 0.25% Marcaine with 10 mg of Decadron was used in divided doses.    The patient tolerated the procedure well.  The patient was taken to the recovery area after the injection.  There were no complications.      Pre Procedure Pain Scale: 5  Pain Score:   5 (post procedure vitals and pain. tpi to neck,shoulders&upper back)    If Jessika Hunt has any questions or concerns after discharge, she was instructed to call us.

## 2021-06-14 NOTE — PROGRESS NOTES
History:  This is a 60 y.o. female who I'm asked to see (by Dr. Velazquez) for evaluation of a right breast cancer.  She presents by herself.  This was picked up as an asymmetry on screening mammogram.  This was new in comparison to a mammogram performed in 2019.  The patient cannot feel a mass.  She denies any recent changes to her breasts.  A needle biopsy was done of the asymmetry which shows invasive ductal carcinoma.  It is estrogen receptor positive, progesterone receptor negative and HER-2 pending FISH.    Past medical history:  Migraines  Melanoma    Past surgical history:  Augmentation mammoplasty  Excision of left upper arm melanoma    Medications:     butalbital-aspirin-caffeine (BUTALBITAL-ASPIRIN-CAFFEINE) -40 mg Tab per tablet, Take by mouth as needed., Disp: , Rfl:      FLUoxetine (PROZAC) 20 MG capsule, , Disp: , Rfl:      ibuprofen (ADVIL,MOTRIN) 600 MG tablet, Take 600 mg by mouth 4 (four) times a day as needed for pain., Disp: , Rfl:      ondansetron (ZOFRAN-ODT) 4 MG disintegrating tablet, DIS ONE T PO Q 6 H PRN, Disp: , Rfl:      RELPAX 40 mg tablet, , Disp: , Rfl:      SUMAtriptan (IMITREX) 100 MG tablet, Take 100 mg by mouth every 2 (two) hours as needed for migraine., Disp: , Rfl:      tiZANidine (ZANAFLEX) 2 MG tablet, , Disp: , Rfl:      traZODone (DESYREL) 50 MG tablet, , Disp: , Rfl:      valACYclovir (VALTREX) 1000 MG tablet, TK 2 TS PO BID FOR ONE DAY, Disp: , Rfl:     Allergies:  Codeine - hives    Social History:  Reports that she has never smoked. She has never used smokeless tobacco.  Denies alcohol and illicit drug use.    Family History:  She has no family history of breast cancer.  Her father  of pancreatic cancer.  He also had prostate cancer.    Review of systems:  General ROS: No complaints or constitutional symptoms  Skin: No complaints or symptoms   Hematologic/Lymphatic: No symptoms or complaints  Psychiatric: No symptoms or complaints  Endocrine: No  "excessive fatigue, no hypermetabolic symptoms reported  Respiratory ROS: No cough, shortness of breath, or wheezing  Cardiovascular ROS: No chest pain or dyspnea on exertion  Breast ROS: No complaints  Gastrointestinal ROS: No abdominal pain, nausea, diarrhea, or constipation  Musculoskeletal ROS: No recent injuries reported  Neurological ROS: No focal neurologic defects reported.      Physical exam:  Resp 16   Ht 5' 3\" (1.6 m)   Wt 111 lb (50.3 kg)   Breastfeeding No   BMI 19.66 kg/m    General: Alert, cooperative, appears stated age   Skin: Skin color, texture, turgor normal, no rashes or lesions   Lymphatic: No obvious adenopathy, no swelling   Eyes: No scleral icterus, pupils equal  HENT: No traumatic injury to the head or face, no gross abnormalities  Lungs: Normal respiratory effort, breath sounds equal bilaterally  Heart: Regular rate and rhythm  Breasts: No palpable abnormality on the left.  Right breast 9:00 zone 2 area of ecchymosis.  Medial to this at the areolar border just deep to the skin is a 1 cm fullness.  No palpable lymphadenopathy.  The patient's implant is also easily palpable, especially out laterally.  Abdomen: Soft, non-distended and non-tender to palpation  Neurologic: Grossly intact    Imaging:  Pertinent images personally reviewed by myself and discussed with the patient.  Radiology reports:  EXAM: MAMMO DIAGNOSTIC 3D RIGHT, US BREAST RIGHT LIMITED 1-3 QUADRANTS  LOCATION: [facility]  [facility]  DATE/TIME: 01/27/2021, 8:40 AM     INDICATION: Right breast asymmetry-call back.  COMPARISON: 01/20/2021, 10/14/2019, 09/07/2017, 06/03/2016.     MAMMOGRAPHIC FINDINGS: Right full-field digital diagnostic mammogram performed. The breasts are extremely dense, which lowers the sensitivity of mammography. Breast tomosynthesis was used in interpretation. On the additional tomographic images, asymmetry and distortion are again seen in the 9:00 position 2 cm from the nipple. The " appearance is suspicious for malignancy. A few scattered benign-appearing round calcifications are again seen in the breast. Subpectoral implant is again noted.     ULTRASOUND FINDINGS: Targeted right breast ultrasound was performed by both the ultrasonographer and the radiologist. In the 9:00 position 2 cm from the nipple, there was a hypoechoic mass with angular margins measuring 1 x 0.9 x 0.9 cm. The appearance is suspicious for malignancy and the mass correlates with the mammographic abnormality.     The right axilla was examined as well with no evidence of lymphadenopathy.     IMPRESSION:   1.  Mass in the right breast is suspicious for malignancy. Ultrasound-guided core biopsy and surgical consultation are recommended. The biopsy will be performed later today. We are helping the patient with scheduling surgical consultation with the [facility] breast surgeons.     ACR BI-RADS Category 5: Highly Suggestive of Malignancy.    Pathology:  BREAST, RIGHT, MASS, 9 O'CLOCK, 2 CM FROM NIPPLE, ULTRASOUND GUIDED NEEDLE CORE BIOPSY:     -  INVASIVE DUCTAL CARCINOMA, CHITO GRADE 1 (TUBULES 2, NUCLEI 2, MITOSES 1) WITH        A FOCAL LOBULAR GROWTH PATTERN     -  GREATEST TUMOR LENGTH, 8 MM     -  DCIS IS NOT IDENTIFIED     ESTROGEN RECEPTOR: STRONGLY POSITIVE (> 95%; 3+), IN INVASIVE CARCINOMA   PROGESTERONE RECEPTOR: NEGATIVE (0%),  IN INVASIVE CARCINOMA   XSC7CUS: INDETERMINATE (SCORE 2+); PARAFFIN EMBEDDED TISSUE WILL BE SUBMITTED FOR REFLEX      HER-2/ROJAS ANALYSIS BY FISH (SEE SUPPLEMENTAL REPORT)     IMPRESSION:  Right breast invasive ductal carcinoma, ER+, MI-, HER2 pending FISH    PLAN:   Discussed the surgical options for treatment of breast cancer which generally are a lumpectomy (partial mastectomy) combined with radiation versus a mastectomy.  Explained that the survival benefit is the same for both.  The difference is in local recurrence risk.  The patient is a good candidate for a  lumpectomy.  It is small and superficial.  My concern involves her underlying breast implants.  These may pose a difficulty for her to receive radiation afterward.  Discussed SLN biopsy.  The procedure and rationale were explained.   Discussed that at this point we do not know yet whether or not she will need chemotherapy and we may not know until we get the results of her HER-2 or all of the results of surgery back.  Sometimes the need for chemotherapy is dependent upon an Oncotype score.  Since the tumor is estrogen receptor positive, she will be a candidate for endocrine therapy.    Overall, we still have some work-up pending and a big surgical decision to make.  If her HER-2 comes back positive, she should consider neoadjuvant therapy.  If it comes back negative, we would plan for surgery.  She has quite dense breast tissue on her mammogram as well as large implants.  Dense breast tissue makes finding subtle abnormalities difficult.  Surgically a lumpectomy would not be difficult, but I would like to make sure that having radiation afterward would be possible.  A referral to radiation oncology was placed.  With her personal and family history of cancers, a referral was placed for genetics.  The results of this may also alter her surgical decision.      We discussed the typical procedure for lumpectomy with sentinel lymph node biopsy at the outpatient surgery center under MAC anesthetic.  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  If she were to pursue mastectomy, she would be interested in both sides along with reconstruction.  This would be performed in the hospital setting along with plastic surgery.  She has a list of plastic surgeons that I work with and she will contact contact them if she is more interested in mastectomy.  She understands that the postoperative recovery would be longer, but still with same-day discharge.    After our discussion, all of her questions were answered to  satisfaction.  We we will await the HER-2 results along with her next consultations.  I invited her to call the office or message me through [employer] if she has further questions or concerns in the meantime.      Karla Darling DO  General Surgeon  [facility]  Breast [address] [address]  Office: [phone]  Employed by - [employer] [facility]

## 2021-06-14 NOTE — TELEPHONE ENCOUNTER
Jessika telephoned and left message regarding the significance of estrogen receptor positive and progesterone receptor negative breast cancer.  This call was returned and voice mail left as instructed suggesting she write this question down to discuss with surgeon.  The general concept about hormonal therapy for estrogen receptor positive breast cancer was also explained in message.  Calls welcomed. Criselda Coleman RN

## 2021-06-14 NOTE — TELEPHONE ENCOUNTER
Telephoned and spoke with Jessika to share estrogen and progesterone receptors from her right breast US-guided needle core biopsy performed on 1/27/2021 at [facility].  HER2 was indeterminate and is being sent for FISH.  Jessika knows writer will contact her when that is available.  Criselda Coleman RN

## 2021-06-14 NOTE — TELEPHONE ENCOUNTER
Telephoned and spoke with Jessika to share pathology results from her right breast US-guided needle core biopsy performed on 1/27/2021 at [facility].    We discussed breast anatomy, histologic type, and next steps.  Hormone receptors are pending and Jessika knows writer will call when those are available.    She is scheduled to meet with Dr. Karla Darling on 2/3/2021.    Jessika gave permission for an e-mail with resources about breast cancer to be sent to her address and has gone out.     telephoned the office of Dr. Melody Rubalcava and was given permission by Rivka to contact patient since she has MyChart.  Dr. Rubalcava will be in the office tomorrow.  Pathology report faxed to provider at 17:43.   Criselda Coleman RN

## 2021-06-14 NOTE — PROGRESS NOTES
Per Breast Radiologist request, scheduled patient to see Dr. Darling  for a surgical consult on 2-3-21 at the [facility].

## 2021-06-15 NOTE — PROGRESS NOTES
"2/12/2021     Jessika Hunt is a 60 y.o. female who is being evaluated via a billable telephone visit.      The patient has been notified of following:     \"This telephone visit will be conducted via a call between you and your physician/provider. We have found that certain health care needs can be provided without the need for a physical exam.  This service lets us provide the care you need with a short phone conversation.  If a prescription is necessary we can send it directly to your pharmacy.  If lab work is needed we can place an order for that and you can then stop by our lab to have the test done at a later time.    Telephone visits are billed at different rates depending on your insurance coverage. During this emergency period, for some insurers they may be billed the same as an in-person visit.  Please reach out to your insurance provider with any questions.    If during the course of the call the physician/provider feels a telephone visit is not appropriate, you will not be charged for this service.\"    Patient has given verbal consent to a Telephone visit? Yes    What phone number would you like to be contacted at? [phone]    Patient would like to receive their AVS by AVS Preference: Gael.    Referring Provider: Melody Rubalcava MD    Present for Today's Visit: Jessika    Presenting Information:   I spoke with Jessika Hunt over the phone today for genetic counseling to discuss her personal and family history of breast cancer.  She is here today to review this history, cancer screening recommendations, and available genetic testing options.    Personal History:  Jessika is a 60 y.o. female. She was diagnosed with a right breast cancer picked up on a screening mammogram. Biopsy completed on 1/27/2021 revealed an invasive ductal carcinoma; ER positive, PA negative, and HER2 negative. She has met with Dr. Darling to discuss surgical options. Jessika stated that the results from genetic testing will help " determine whether bilateral mastectomy is the right surgical option for her. She has met with Dr. Kemp to discuss possible radiation therapy. She has an upcoming appointment with Dr. Odonnell in plastic surgery on 2/16. She shared that she has breast implants.     She also reports a history of a melanoma removed from her left arm at age 55. She follows with dermatology every 6 months.       She had her first menstrual period at age 18, her first child at age 42, and is postmenipausal.  Jessika has her ovaries, fallopian tubes and uterus in place, and she has had no ovarian cancer screening to date.  She reports past history of oral contraceptive use from ages [age] and that she was on hormone replacement therapy for about 5 years.      Her most recent OB-GYN exam and Pap smear about 3 years ago were normal.  She has annual mammograms and has extremely dense breasts.  She began having colonoscopies at the age of 51. Her first and most recent colonoscopy at age 51 was normal and follow-up was recommended in 10 years. She reports that she has a colonoscopy scheduled for April.  She does not regularly do any other cancer screening at this time.  Jessika reported former tobacco use (20's-30's; social), and 0-2 drinks per week for alcohol use.    Family History: Jessika's family history is significant for the following (Please see scanned pedigree for detailed family history information)  Children/Siblings    Jessika has one biological daughter, age 18, who is healthy with cancer history. Jessika has one adopted daughter, age 16, healthy.     Jessika has a sister, age 67, with a history of a melanoma near her eye diagnosed at age 67.     Jessika has a sister, age 63, with no reported cancer history.     Jessika has a sister, age 58, with no reported cancer history.   Maternal    Jessika's mother passed away at age 86 from complications due to progressive frontal lobe aphasia with no reported cancer history.     Jessika has five maternal  uncles. One of her maternal uncles passed at age 18 in a car accident; no cancer history. Three additional uncles have passed in their 70's/80's from lung issues (emphysema/lung cancer; all smokers).    No cancer history reported in either of her maternal grandparents or any of her maternal first-cousins.    Dean Rosen's father passed away at age 79 from pancreatic cancer diagnosed at age 79 with a history of prostate cancer diagnosed at age 78.     Jessika has one paternal aunt who passed in her 40's from an unknown cause.     No cancer history reported in either of her paternal grandparents. Jessika has no paternal first-cousins.       Her maternal ethnicity is [place] and [place]. Her paternal ethnicity is [place].  There is no known Ashkenazi Yazidism ancestry on either side of her family. There is no reported consanguinity.    Discussion:    Jessika's personal history of breast cancer and melanoma and family history of pancreatic and prostate cancer and melanoma is suggestive of a hereditary cancer syndrome.    We reviewed the features of sporadic, familial, and hereditary cancers. In looking at Jessika's family history, it is possible that a cancer susceptibility gene is present due to her breast cancer diagnosis, her father's pancreatic and prostate cancer diagnoses, and her sister's melanoma.    We discussed the natural history and genetics of hereditary cancers. A detailed handout regarding the information we discussed will be sent to Jessika via [employer]. Topics included: inheritance pattern, cancer risks, cancer screening recommendations, and also risks, benefits and limitations of testing.    We reviewed that the most common cause of hereditary breast cancer is Hereditary Breast and Ovarian Cancer (HBOC) syndrome, which is caused by mutations in the genes BRCA1 and BRCA2. BRCA1 and BRCA2 are two genes that increase the risk for breast and ovarian cancers, among others. Women who inherit a BRCA mutation have a 50  to 85% lifetime risk of breast cancer and up to a 40-58% lifetime risk of ovarian cancer. This is higher than the general population lifetime risks of 12% for breast cancer and less than 2% for ovarian cancer. Men with BRCA gene mutations have up to a 7% risk of breast cancer and 20% risk of prostate cancer. Other cancers, such as pancreatic cancer and melanoma, have also been associated with BRCA mutations.    Based on her personal and family history, Jessika meets current National Comprehensive Cancer Network (NCCN) criteria for genetic testing of high-penetrance breast and/or ovarian cancer susceptibility genes.      We discussed that there are additional genes that could cause increased risk for breast and other cancers. As many of these genes present with overlapping features in a family and accurate cancer risk cannot always be established based upon the pedigree analysis alone, it would be reasonable for Jessika to consider panel genetic testing to analyze multiple genes at once.    We discussed genetic testing options for Jsesika given her personal and family history including genes associated with increased risk for breast cancer (BRCANext-Expanded) and adding genes associated with pancreatic cancer risk and melanoma risk given her and her sister's melanoma history and her father's pancreatic cancer history (CustomNext-Cancer; CancerNext + MelanomaNext; 40 genes). Jessika was interested in learning as much as possible from this testing given her family history. She opted to proceed with genetic testing via the CustomNext-Cancer panel through [employer].   Genetic testing is available for 40 genes associated with hereditary cancer: CustomNext-Cancer (APC, SHANNAN, AXIN2, BAP1, BARD1, BMPR1A, BRCA1, BRCA2, BRIP1, CDH1, CDK4, CDKN2A, CHEK2, DICER1, EPCAM, GREM1, HOXB13, MITF, MLH1, MSH2, MSH3, MSH6, MUTYH, NBN, NF1, NTHL1, PALB2, PMS2, POLD1, POLE, POT1, PTEN, RAD51C, RAD51D, RB1, RECQL, SMAD4, SMARCA4, STK11, and  TP53).  We discussed that many of the genes in the CustomNext-Cancer panel are associated with specific hereditary cancer syndromes and published management guidelines: Hereditary Breast and Ovarian Cancer syndrome (BRCA1, BRCA2), Pena syndrome (MLH1, MSH2, MSH6, PMS2, EPCAM), Familial Adenomatous Polyposis (APC), Hereditary Diffuse Gastric Cancer (CDH1), Familial Atypical Multiple Mole Melanoma syndrome (CDK4, CDKN2A), Juvenile Polyposis syndrome (BMPR1A, SMAD4), Cowden syndrome (PTEN), Li Fraumeni syndrome (TP53), Peutz-Jeghers syndrome (STK11), MUTYH Associated Polyposis (MUTYH), Neurofibromatosis type 1 (NF1), Hereditary Retinoblastoma (RB1).   The SHANNAN, AXIN2, BRIP1, CHEK2, GREM1, MSH3, NBN, NTHL1, PALB2, POLD1, POLE, RAD51C, and RAD51D genes are associated with increased cancer risk and have published management guidelines for certain cancers.    The remaining genes (BARD1, BAP1, DICER1, HOXB13, MITF, POT1, RECQL, and SMARCA4) are associated with increased cancer risk and may allow us to make medical recommendations when mutations are identified.      Consent was obtained over the phone with no witness required due to the current covid19 global pandemic.    Medical Management: For Jessika, we reviewed that the information from genetic testing may determine:    surgery to treat Jessika's active cancer diagnosis (i.e. lumpectomy versus bilateral mastectomy, partial versus total colectomy, etc.),    additional cancer screening for which Jessika may qualify (i.e. mammogram and breast MRI, more frequent colonoscopies, more frequent dermatologic exams, etc.),    options for risk reducing surgeries Jessika could consider (i.e. bilateral mastectomy, surgery to remove her ovaries and/or uterus, etc.),      and targeted chemotherapies for Jessika's active cancer, or if she were to develop certain cancers in the future (i.e. immunotherapy for individuals with Pena syndrome, PARP inhibitors, etc.).     These recommendations and  possible targeted chemotherapies will be discussed in detail once genetic testing is completed.    I explained that Jessika will be contacted by our scheduling team to set up a lab appointment to get her blood drawn for genetic testing at either [facility] cancer care lab or [facility] cancer care lab at her earliest convenience.     We discussed that Jessika's surgical decisions are pending genetic testing results. For that reason, I will place a STAT request on the BRCAPlus genes (SHANNAN, BRCA1, BRCA2, CDH1, CHEK2, PALB2, PTEN, and TP53) in order to get these results back as soon as possible.      Plan:  1) Today Jessika elected to proceed with CustomNext-Cancer genetic testing (40 genes) through [facility].  2) A STAT request was placed on the BRCAPlus genes (including BRCA1 and BRCA2) and we will get these results back in 7-10 days. I will call Jessika with these results as soon as they are available.     Time spent over the phone: 57 minutes    Deepika Hernández MS, [facility]  Licensed Genetic Counselor  [facility]  [phone]

## 2021-06-15 NOTE — PROGRESS NOTES
"Jessika presents to [facility] today for a surgical consult with Dr. Darling  regarding her newly diagnosed right breast cancer.  She is accompanied by herself for consult.  RN assessment and EMR update. Resp 16   Ht 5' 3\" (1.6 m)   Wt 111 lb (50.3 kg)   Breastfeeding No   BMI 19.66 kg/m    Patient given a Breast Cancer Packet, contents reviewed.  She met with Dr. Darling  see dictation for details of visit. She will plan to meet with Rad Onc to discuss the option of having radiation with her existing implants.  She will also meet with genetics before making a surgical decision.  Her 2 is pending by FISH.  Support provided, invited calls.  RN time 20 mins  "

## 2021-06-15 NOTE — PROGRESS NOTES
Jessika Hunt is a 60 y.o. female who is being evaluated via a billable video visit.      How would you like to obtain your AVS? [employer] ([employer]).  If dropped from the video visit, the video invitation should be resent by: Text to cell phone: [phone]  Will anyone else be joining your video visit? No      Video-Visit Details    Type of service:  Video Visit    Distant Location (provider location):  [facility] RADIATION [facility]COLOG[facility] [place] [facility]     Platform used for Video Visit: Saadia, through [employer] ([employer])    NURSING NOTE:  Reached pt via phone prior to MD portion of video consult. She is trying to gather information about her options prior to making a tx decision. Right now she's debating between lumpectomy and radiation VS. Mastectomy. She knows that recommendations may change based on final pathology from surgery. She also has breast implants so has many good questions for Dr. Kemp about how that would work with radiation. I emailed her some information: RO RN welcome letter, prior authorization information, managing fatigue leaflet, and breast radiation sheet. I reviewed the routine for radiation including today's consult, follow up exam with Dr. Kemp and CT simulation likely the same day, tx positioning, CT simulation purpose, daily treatment, weekly RO visits, and common s/e of skin irritation and fatigue. She verbalized her understanding. I asked her to please call rather than email with questions.     Leilani Ellis RN, OCN  Radiation Oncology Nurse

## 2021-06-15 NOTE — TELEPHONE ENCOUNTER
Jessika emailed requesting cancellation of medical oncology consult with Dr. Nguyen on 2/23.  She will see Dr. Santiago at [facility] on 2/15. Criselda Coleman RN

## 2021-06-15 NOTE — ANESTHESIA CARE TRANSFER NOTE
Last vitals:   Vitals:    03/11/21 1018   BP: 113/66   Pulse: 69   Resp: 13   Temp: 36.3  C (97.4  F)   SpO2: 99%     Patient's level of consciousness is drowsy, responding to questions  Spontaneous respirations: yes  Maintains airway independently: yes  Dentition unchanged: yes  Oropharynx: oropharynx clear of all foreign objects    QCDR Measures:  ASA# 20 - Surgical Safety Checklist: WHO surgical safety checklist completed prior to induction    PQRS# 430 - Adult PONV Prevention: 4558F - Pt received => 2 anti-emetic agents (different classes) preop & intraop  ASA# 8 - Peds PONV Prevention: NA - Not pediatric patient, not GA or 2 or more risk factors NOT present  PQRS# 424 - Kallie-op Temp Management: 4559F - At least one body temp DOCUMENTED => 35.5C or 95.9F within required timeframe  PQRS# 426 - PACU Transfer Protocol: - Transfer of care checklist used  ASA# 14 - Acute Post-op Pain: ASA14B - Patient did NOT experience pain >= 7 out of 10

## 2021-06-15 NOTE — PROGRESS NOTES
Per Dr. Darling'  request, order for Oncotype submitted through [employer] online portal.

## 2021-06-15 NOTE — TELEPHONE ENCOUNTER
"3/1/2021    Referring Provider: Dr. Darling    Presenting Information:  I spoke with Jessika to discuss her genetic test results.  Her blood was drawn on 2/16/2021.  [facility]Can[facility] (CancerNext + MelanomaNext; 40 genes) testing was ordered from [facility]. This testing was done because of her personal history of breast cancer and melanoma and family history of pancreatic and prostate cancer and melanoma.    Genetic Testing Result: Multiple Variants of Uncertain Significance (VUS)  Jessika was found to have 2 variants of uncertain significance (VUS).      BRCA1 p.W4293Y (c.3722C>A)      PMS2 p.V251M (c.751G>A)    No other variants or mutations were found in these genes. Given the uncertain significance of this result, medical management decisions should NOT be made based on this test result alone.    Of note, Jessika is negative for mutations in the APC, SHANNAN, AXIN2, BAP1, BARD1, BMPR1A,BRCA1, BRCA2, BRIP1, CDH1, CDK4, CDKN2A, CHEK2, DICER1, MLH1, MSH2, MSH3, MSH6, MUTYH, NBN, NF1, NTHL1, PALB2,PMS2, POT1, PTEN, RAD51C, RAD51D, RB1, RECQL, SMAD4, SMARCA4, STK11 and TP53 (sequencing and deletion/duplication);HOXB13, MITF, POLD1 and POLE (sequencing only); EPCAM and GREM1 (deletion/duplication only) genes. No mutations were found in any of the 40 genes analyzed.     Testing did not detect an identifiable mutation associated with Hereditary Breast and Ovarian Cancer syndrome (BRCA1, BRCA2), Pena syndrome (MLH1, MSH2, MSH6, PMS2, EPCAM), Familial Adenomatous Polyposis (APC), Hereditary Diffuse Gastric Cancer (CDH1), Familial Atypical Multiple Mole Melanoma syndrome (CDK4, CDKN2A), Juvenile Polyposis syndrome (BMPR1A, SMAD4), Cowden syndrome (PTEN), Li Fraumeni syndrome (TP53), Peutz-Jeghers syndrome (STK11), MUTYH Associated Polyposis (MUTYH), Neurofibromatosis type 1 (NF1), Hereditary Retinoblastoma (RB1).     A copy of the test report can be found in the Media tab and named \"Genetics Scan- [employer]\". The report is scanned " in as a linked document.    Interpretation:  We discussed several different interpretations of this inconclusive test result. It is not clear if any of these variants are associated with increased cancer risk. These variants may be benign changes that do not increased cancer risk, or they may be harmful mutations that cause increased risk for certain cancers.    BRCA1 p.Q0397A (c.3722C>A)  We discussed that pathogenic (harmful) mutations in the BRCA1 gene increase the risk for breast and ovarian cancers, among others. Women who inherit a BRCA1 mutation have a 50 to 85% lifetime risk of breast cancer and up to a 40-58% lifetime risk of ovarian cancer. This is higher than the general population lifetime risks of 12% for breast cancer and less than 2% for ovarian cancer. Men with BRCA1 gene mutations have an increased risk for male breast cancer and prostate cancer. Other cancers, such as pancreatic cancer and melanoma, have also been associated with BRCA1 mutations.    We discussed that there are specific screening and management recommendations for those with pathogenic (harmful) mutations in the BRCA1 gene. I reiterated that, at this time, the variant found in the BRCA1 gene for Jessika is considered a variant of uncertain significance and her medical management will NOT change based on this result alone.     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  Pena syndrome may present with polyps, but typically a small number. The highest cancer risks associated with Pena syndrome include colon cancer, endometrial/uterine cancer, gastric cancer, and ovarian cancer.    We discussed that there are specific screening and management recommendations for those with mutations in the PMS2 gene. I reiterated that, at this time, the variant found in the PMS2 gene for Jessika is considered a variant of uncertain significance and her medical management  will NOT change based on this result alone.     In rare situations in which both parents have a mutation in the PMS2 gene, their children each have a 25% risk for constitutional mismatch repair deficiency syndrome (CMMRD).  CMMRD is a disorder that causes cafe-au-lait spots and risk for childhood cancers. If this variant is later classified as harmful, Jessika would be considered a carrier for CMMRD. In that case, genetic counseling and genetic testing may be advised for her partner.    The laboratory is working to determine if any of these variants are harmful or benign, and they will contact me if any of them are reclassified. If these variants are determined to be benign, there may be a different gene or combination of genes and environment that are associated with the cancers in Jessika and/or her relatives.    It is also important to consider that her other relatives with cancer history may have had a mutation in one of the genes tested and she did not inherit it.  There is also a small possibility that there is a mutation in one of these genes and we could not find it with our current testing methods.       Inheritance:  We reviewed the autosomal dominant inheritance of the variants in the BRCA1 and PMS2 genes.  We discussed that Jessika has a 50% chance to pass each of these variants to each of her children. Likewise, there is a 50% chance for each of these variants to be present in each of her siblings. Because it is unclear what, if any, risk is associated with these variants, clinical genetic testing for these BRCA1 and PMS2 variants alone is not recommended for relatives.    Family Studies:  The laboratory may offer additional research based testing for specific relatives in order to help reclassify these variants.    Screening:  Based on this inconclusive test result, it is important for Jessika and her relatives to refer back to the family history for appropriate cancer screening.      Jessika should continue to  follow her oncology team's recommendations for the treatment and follow-up for her breast cancer history.  We did discuss that fact that Jessika is currently deciding which breast surgery will be right for her. She shared that she is leaning solidly towards bilateral mastectomies. I shared that, while 90% of variants of uncertain significance get downgraded to a benign (non-harmful) variant, it is possible that this BRCA1 variant of uncertain significance is a pathogenic (harmful) mutation. We should consider the possibility that this variant of uncertain significance could truly be a pathogenic mutation when it comes to her breast surgery decision. It would be reasonable for Jessika to consider bilateral mastectomies, especially if this is the way she was leaning already, but these genetic test results should not be the sole reason for her to consider bilateral mastectomies.     Due to the family history of melanoma, Jessika's close relatives remains at increased risk for developing melanoma. According to the American Cancer Society, Jessika's close relatives are encouraged to speak to their primary care providers about having regular skin exams and safe skin practices (i.e. sunscreen, self skin exams, limited sun exposure, etc.).    We discussed that Jessika likely has some increased risk for pancreatic cancer given her father's history, but routine screening is typically not recommended.  Jessika is encouraged to discuss this history with her care providers.     Other population cancer screening options, such as those recommended by the American Cancer Society and the National Comprehensive Cancer Network (NCCN), are also appropriate for Jessika and her family. These screening recommendations may change if there are changes to Jessika's personal and/or family history. Final screening recommendations should be made by each individual's managing physician.     Additional Testing Considerations:  Although Jessika's genetic testing  result is inconclusive, other relatives may still carry a harmful gene mutation associated with pancreatic cancer. Genetic counseling is recommended for her siblings to discuss genetic testing options.  If any of these relatives do pursue genetic testing, Jessika is encouraged to contact me so that we may review the impact of their test results on her    Summary:  We do not have an explanation for her personal or family history of cancer. Because of that, it is important that she continue with cancer screening based on her personal and family history as discussed above.    Genetic testing is rapidly advancing, and new cancer susceptibility genes will most likely be identified in the future. Therefore, I encouraged Jessika to contact me annually or if there are changes in her personal or family history. This may change how we assess her cancer risk, screening, and the testing we would offer.    Plan:  1.  I provided Jessika with a copy of her test results today.    2. She plans to follow-up with her oncology team for treatment for her breast cancer and her primary care providers for routine care.  3. She should contact me annually, or sooner if her family history changes.  4. I will contact Jessika if the laboratory informs me that these variants have been reclassified.  This may change screening and testing recommendations for Jessika and her relatives.    If Jessika has any further questions, I encouraged her to contact me at [phone].      Time spent over the phone: 15 minutes    Deepika Hernández MS, [facility]  Licensed Genetic Counselor  [facility]  [phone]

## 2021-06-15 NOTE — TELEPHONE ENCOUNTER
Telephoned and spoke with Jessika to inform her of the HER2 by FISH results from her breast biopsy.  This was HER2 negative by FISH.    Thanked her for faxing her completed Patient HIstory form for tomorrow's consult with Dr. Kemp.  This has been e-mailed by writer to rad onc yanna simmons.    Jessika is meetin with Dr. Gabi Odonnell for discussion about plastic surgery.  She has existing implants. Criselda Coleman RN

## 2021-06-15 NOTE — ANESTHESIA POSTPROCEDURE EVALUATION
Patient: Jessika Hunt  Procedure(s):  BILATERAL SKIN-SPARING MASTECTOMY (Bilateral)  WITH RIGHT SENTINEL LYMPH NODE BIOPSY@0720 (Right)  BILATERAL DIRECT TO IMPLANT BREAST RECONSTRUCTION, CAPSULOTOMY, WITH PLACEMENT OF BILATERAL ALLODERM (Bilateral)  REMOVAL, IMPLANT, BREAST (Bilateral)  Anesthesia type: general    Patient location: PACU  Last vitals:   Vitals Value Taken Time   /76 03/11/21 1300   Temp 36.8  C (98.2  F) 03/11/21 1300   Pulse 97 03/11/21 1305   Resp 16 03/11/21 1245   SpO2 94 % 03/11/21 1306   Vitals shown include unvalidated device data.  Post vital signs: stable  Level of consciousness: awake and responds to simple questions  Post-anesthesia pain: pain controlled  Post-anesthesia nausea and vomiting: no  Pulmonary: unassisted, return to baseline  Cardiovascular: stable and blood pressure at baseline  Hydration: adequate  Anesthetic events: no    QCDR Measures:  ASA# 11 - Kallie-op Cardiac Arrest: ASA11B - Patient did NOT experience unanticipated cardiac arrest  ASA# 12 - Kallie-op Mortality Rate: ASA12B - Patient did NOT die  ASA# 13 - PACU Re-Intubation Rate: ASA13B - Patient did NOT require a new airway mgmt  ASA# 10 - Composite Anes Safety: ASA10A - No serious adverse event    Additional Notes:

## 2021-06-15 NOTE — TELEPHONE ENCOUNTER
New Patient Oncology Nurse Navigator Note     Referring provider: Dr. Karla Darling     Referring Clinic/Organization: [facility]     Referred to (specialty): Radiation oncology    Requested provider (if applicable): N/A     Date Referral Received: 2/3/2021     Evaluation for : Breast Cancer     Clinical History (per Nurse review of records provided):    1/20/21 - bilateral screening mammogram showed asymmetry in the right breast.    1/27/21 - right breast diagnostic mammogram and US performed  1/27/21 -  Right breast US-guided needle core biopsy were performed showing invasive ductal carcinoma , Grade 1, estrogen receptor strongly positive at >95%, progesterone receptors negative (0%) and HER2 indeterminate.  HER2 by FISH is pending but sent from lab on 1/29/21.      Patient had surgical consult with Dr. Darling on 2/3/21 and she referred patient to genetic counseling, and radiation oncology.  Patient has bilateral subpectoral breast implants          Clinical Assessment / Barriers to Care (Per Nurse): N/A     Records Location (Care Everywhere, Media, etc.): except for [facility], all appear in [facility]     Records Needed: [facility] primary care provider records     Additional testing needed prior to consult: N/A    Telephoned and spoke with Jessika to scheduled radiation oncology consult  Appointment created with Dr. Marge Kemp on 2/9/21 at 1:30 (1:00pm nurse).  Jessika is aware this will likely be a virtual appointment.      Jessika knows HER2 by FISH is still pending and writer will call when available.      She has been referred for genetic testing and schedulers will be reaching out to her. Criselda Coleman RN

## 2021-06-15 NOTE — ANESTHESIA PREPROCEDURE EVALUATION
Anesthesia Evaluation      Patient summary reviewed   No history of anesthetic complications     Airway   Mallampati: II   Pulmonary - negative ROS and normal exam                          Cardiovascular - normal exam  Exercise tolerance: > or = 4 METS  Rhythm: regular  Rate: normal,         Neuro/Psych - negative ROS     Endo/Other - negative ROS      Comments: right breast  invasive ductal CA    GI/Hepatic/Renal - negative ROS      Other findings: Results for MEDHAT MACIEL (MRN [id number]) as of 3/10/2021 17:59    3/9/2021 09:34  SARS-CoV-2 Virus Specimen Source: Nasopharyngeal  SARS-CoV-2 PCR Result: NEGATIVE  Results for MEDHAT MACIEL (MRN [id number]) as of 3/10/2021 17:59    3/3/2021 14:55  WBC: 6.2  RBC: 4.27  Hemoglobin: 13.6  Hematocrit: 40.2  MCV: 94  MCH: 31.9  MCHC: 33.8  RDW: 13.1  Platelets: 251        Dental - normal exam                        Anesthesia Plan  Planned anesthetic: general endotracheal and total IV anesthesia  GAETT  TIVA  Antiemetics  Magnesium infusion intraop  Surgeon historically has does blocks for POP  Scopolamine  Soft bite block  toradol at the end of the case if okay with the surgeon  ASA 2   Induction: intravenous   Anesthetic plan and risks discussed with: patient  Anesthesia plan special considerations: antiemetics,   Post-op plan: routine recovery

## 2021-06-15 NOTE — TELEPHONE ENCOUNTER
Called Jessika with her Negative Her2 by FISH results.  She had already been notified by Criselda Nurse Navigator.  Jessika has an appointment with Genetics but did meet with Dr. Kemp from Rad Onc and is leaning towards having a lumpectomy followed by radiation.  She does have an appointment next week with Dr. Odonnell regarding her current implants.  She will plan to see Dr. Arias for Med Onc.  Support provided,invited calls.

## 2021-06-15 NOTE — PROGRESS NOTES
Jessika presents to [facility] for a follow up surgical consult with Dr. Darling.  She is scheduled for her bilateral mastectomies with Dr. Darling and  for this Thursday.  RN assessment and EMR update.  Patient met with Dr. Darling, see dictation for details of visit.  Patient will proceed with planned surgery for 3-11-21.  Support provided, invited calls. RN time 12 mins

## 2021-06-15 NOTE — TELEPHONE ENCOUNTER
Called patient after hearing from her Nurse Navigator, Criselda, that she had questions about surgery.  Patient saw Dr. Odonnell this am and is now leaning towards having a bilateral mastectomy with reconstruction.  Clarified for patient that most patient's that have a mastectomy don't need radiation, but occasionally it does happen that they will and we don't know that until after her surgery.  She is awaiting her genetic testing results and then will call with her finalized surgery plan.  Support provided, invited calls.

## 2021-06-15 NOTE — TELEPHONE ENCOUNTER
Records in Epic Chart Review / Radiology images in [place].    Notes  2/3/2021 - Progress Notes / Consult Gen Surg, Dr. Darling.    Labs  1/27/2021 - Surgical Pathology / Right Breast Core Biopsy.    Imaging  1/27/2021 - Mammo Diagnostic Right & US Right Breast & Biopsy.  1/20/2021 - Mammo Screening Bilateral.  10/14/2019 - Mammo Screening Bilateral.    Media - [facility]  2/8/2021 - Medication / Entira.  12/2/2020 - Progress Note Ext / Karey Ceja PA-C.  9/10/2020 - Progress Note Ext / Dr. Melody Rubalcava.  8/18/2020 - Progress Note Ext / Karey Ceja PA-C.  5/29/2020 - Progress Note Ext / Dr. Melody Rubalcava.  8/23/2019 - DEXA.

## 2021-06-15 NOTE — TELEPHONE ENCOUNTER
Telephoned and spoke with Jessika to follow up after her e-mails about meeting with a medical oncologist as soon as possible. She met with Dr. Kemp today and has questions now for medical oncologist.  We discussed the typical sequence of treatment for breast cancer, but did go ahead and schedule an appointment with Dr. aRshi Nguyen for 2/23/2021 at our [facility] location arriving at 10:30am for an 11:00am consult.  Jessika lives in [place] and knows subsequent appointments can be at [facility].  Please see 2/4/2021 for oncology nurse navigator note.

## 2021-06-16 NOTE — PROGRESS NOTES
Jessika presents to [facility] for a post op appointment with Dr. Darling .  She is accompanied by herself for appointment.  She states she is doing well, minimal pain.  She has good ROM, reviewed ROM exercises with her.  Patient met with Dr. Darling .  See dictation for details of visit.  She will see Dr. Odonnell later this week and hopefully have her drains removed. She had previously been referred to [facility] from [facility], but asked to see someone within this system.  She will plan to be referred onto Medical Oncology and will plan to follow up with Dr. Darling  in one year with bilateral mammograms.  Invited calls sooner if she has any questions.  RN time 12 mins

## 2021-06-16 NOTE — PROGRESS NOTES
History:  Jessika Hunt is s/p bilateral skin sparing mastectomy with immediate reconstruction on March 11, 2021.  She is doing really well.  Only took pain medication for 4 days.  Drains are getting irritating, but plans on them coming out in 2 days.    Physical exam:  BREAST: Bilateral chest incisions healing well with glue over them.  Drains serous.    Pathology:  A) RIGHT SKIN-SPARING SIMPLE MASTECTOMY CONTAINING NEOPLASTIC LESION:         - SEE FULL SYNOPTIC REPORT BELOW         - SUMMARY OF SYNOPSIS:           - INVASIVE DUCTAL CARCINOMA           - HISTOLOGIC GRADE (CHITO HISTOLOGIC SCORE):             - TUBULE FORMATION SCORE 3             - NUCLEAR PLEOMORPHISM SCORE 2             - MITOTIC RATE SCORE 1             - OVERALL GRADE 2 (TOTAL SCORE 6/9)           - TUMOR IS UNIFOCAL, AND 8 MM IN GREATEST DIAMETER           - DUCTAL CARCINOMA IN-SITU (DCIS): PRESENT, COMPRISING <10% OF TOTAL TUMOR,               ALL CONTAINED WITHIN INVASIVE TUMOR AREA, 12 MM IN GREATEST EXTENT,               FLAT PATTERN, LOW NUCLEAR GRADE, NON-NECROTIC           - LOBULAR CARCINOMA IN-SITU (LCIS): NOT IDENTIFIED           - NEGATIVE FOR LYMPHOVASCULAR INVASION OR DERMAL LYMPHOVASCULAR INVASION           - MICROCALCIFICATIONS: NOT IDENTIFIED           - ALL MARGINS UNINVOLVED BY INVASIVE CARCINOMA OR DCIS; CLOSEST MARGIN TO               INVASIVE CARCINOMA IS DEEP/POSTERIOR MARGIN, 3 MM FROM TUMOR           - THREE SENTINEL LYMPH NODES NEGATIVE FOR METASTATIC TUMOR (0/3 LYMPH               NODES POSITIVE)           - EXTENSIVE STROMAL FIBROSIS, CONSISTENT WITH BREAST IMPLANT CAPSULE           - TUMOR IS STAGE pT1c and pN0sn           - ESTROGEN RECEPTORS POSITIVE (95% OF TUMOR NUCLEI STAIN STRONGLY)           - PROGESTERONE RECEPTORS WEAKLY POSITIVE              (5% OF TUMOR NUCLEI STAIN AT INTERMEDIATE STRENGTH)           - HER2/ROJAS RECEPTORS NEGATIVE (1+ TUMOR CELL MEMBRANE STAINING)           - E-CADHERIN IMMUNOSTAIN  IS STRONGLY POSITIVE, CONFIRMING DUCTAL ORIGIN     B)  RIGHT SENTINEL LYMPH NODES:         - THREE LYMPH NODES NEGATIVE FOR METASTATIC CARCINOMA         - NEGATIVE FOR MICROMETASTASES OR INDIVIDUAL TUMOR CELLS, CONFIRMED             BY NEGATIVE PANCYTOKERATIN IMMUNOSTAIN     C) LEFT SKIN-SPARING SIMPLE MASTECTOMY:         - PROLIFERATIVE FIBROCYSTIC CHANGES, WITH NUMEROUS LARGE (4-5 MM) CYSTS         - NEGATIVE FOR EPITHELIAL ATYPIA OR MALIGNANCY         - FOCALLY INTENSE STROMAL FIBROSIS, CONSISTENT WITH BREAST IMPLANT CAPSULE         - NORMAL NIPPLE AND AREOLA         - NORMAL SKIN         - NORMAL ADIPOSE TISSUE     ASSESSMENT:  Jessika Hunt is s/p bilateral mastectomy for invasive ductal carcinoma    - grade 2, T1, N0, ER/WI+, oncotype 23  --> prognostic stage IA    PLAN:  Pathology was discussed  Continue physical activity restrictions per Dr. Odonnell.  Referral placed for medical oncology.   Discontinue yearly mammograms.  Will screen for breast cancer recurrence through physical exams.  Return to the breast clinic in 1 year, or earlier as needed    Karla Darling DO  General Surgeon  [facility]  Breast [address] [address]  Office: [phone]  Employed by - [employer]

## 2021-06-16 NOTE — TELEPHONE ENCOUNTER
Jessika sent e-mail indicating she will be seeing Dr. Santiago at [facility] for her care.  The consult with Dr. Albert for 4/15 is therefore cancelled. Criselda Coleman RN

## 2021-06-16 NOTE — PROGRESS NOTES
Received a call from BONNIE Loza at [facility] with Dr. Arias asking for Oncotype results.  Dr. Arias had met with patient prior to her surgery.  Faxed results to [phone].

## 2021-06-16 NOTE — PROGRESS NOTES
Faxed History and Physical by Dr. Darling and treatment plan by Dr. Darling to [phone] per their request for PA.

## 2021-06-16 NOTE — TELEPHONE ENCOUNTER
New Patient Oncology Nurse Navigator Note     Referring provider: Dr. Karla Darling     Referring Clinic/Organization: [facility]     Referred to (specialty): Medical oncology    Requested provider (if applicable): N/A     Date Referral Received:3/25/2021     Evaluation for : breast cancer     Clinical History (per Nurse review of records provided):    She underwent screening mammogram on 1/20/2021 that showed an asymmetry in the right breast at the 9 o'clock position mid depth. Diagnostic mammogram on 1/27/2021 confirmed the asymmetry and distortion. Ultrasound demonstrated a 1 x 0.9 x 0.9 cm hypoechoic mass at the 9 o'clock position 2 cm from the nipple. Right axillary ultrasound did not demonstrate lymphadenopathy.     Ultrasound-guided biopsy was also performed on 1/27/21 with pathology ([id number]) demonstrating IDC, Grade I, ER >95% strong, LA negative, HER2 indeterminate (2+) by IHC, non-amplified by FISH.     She was seen by Dr. Karla Darling for surgical consult on 2/3/21.      Patient met with Dr. Marge Kemp for radiation oncology for pre-op consult on 2/9/2021.      Patient met with Deepika Hernández for genetic counseling on 2/12/21.  Genetic testing was performed and results follow (please see Deepika Hernández 3/1/21 notation)  Jessika was found to have 2 variants of uncertain significance (VUS).     BRCA1 p.U4263C (c.3722C>A)      PMS2 p.V251M (c.751G>A)     She also was seen by Dr. Akhtar in surgery at [facility] for a second opinion on 2/18/2021. Dr. Akhtar noted a 1.5 cm mobile rubbery left axillary lymph node. They discussed surgical options and she was referred to radiation oncology for discussion of adjuvant therapy.  That radiation oncology discussion took place with Zhao John MD  on 2/24/21.  Also had opinion from Dr. GENEVA Torres, plastic surgeon, on 2/26/21    3/11/2021 - bilateral skin-sparing mastectomy performed by Dr. Darling removing previous implants from breast  augmentation.  Dr. Gabi Odonnell did bilateral direct to implant breast reconstruction using silicone implants and Alloderm, prepectoral placement of implants and bilateral total capsulectomy to existent breast implants and transition from subpectoral to pre-pectoral pocket.     Pathology from bilateral mastectomies revealed right breast, IDC, Grade 2, 8mm, with hormone receptors also run on specimen as ER+, CO weakly positive (5%), and HER2 negative.  Three right sentinel lymph nodes were negative for metastatic carcinoma.  Left mastectomy was negative for atypia or malignancy.      Dr Darling ordered an Oncotype and that has reported with score of 23.      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 Hutchinson in 2016 and progress notes.   Additional testing needed prior to consult: N/A    Telephoned and spoke with Jessika and scheduled medical oncology consult with Dr. Albert on 4/15/2021.  Welcome letter will be e-mailed to Jessika today.  She has already completed Patient History form and submitted at February 2021 consult with Dr. Kemp of radiation oncology.    A separate e-mail will be sent with ASHIA to return as instructed to our medical records for retrieval of progress notes (approximately 2016-present) and biopsy report from melanoma excision in approximately 2016). Criselda Coleman RN

## 2021-06-17 NOTE — PROGRESS NOTES
Injection - Other  Date/Time: 4/25/2018 10:10 AM  Performed by: EUNICE MAGANA  Authorized by: EUNICE MAGANA   Comments:     BOTOX INJECTION FOR CHRONIC INTRACTABLE MUSCLE SPASM WITH MIGRAINE HEADACHES  Performed on: 4/25/2018    Ms. Hunt is a 58-year-old woman who has chronic migraine headaches.  She also has muscle spasm in the neck and shoulders which aggravates the headaches.  Botox injections give her some significant relief.  She has not been here for just over a year.  She says she has been struggling and using other modalities to treat the headaches.  She would like to try the Botox injection again as this worked well for her.    Pre Procedure Diagnosis:  Chronic Intractable Migraine Headaches  Vital Signs:  As per intra-procedure documentation    The procedure was discussed with Jessika Hunt in detail along with the attendant risks, including but not limited to: nerve injury, infection, bleeding, allergic reaction, or worsening of pain.  Informed consent was obtained and patient elected to proceed.    After informed consent was obtained the patient was seated in examination chair.  The head, neck and shoulder areas were prepped sterilely with alcohol.  A 1 inch #30-gauge needle was used.  There were injections made in the upper sternocleidomastoid muscles bilaterally.  Injections were also in made in the posterior auricular and temporal muscles bilaterally.  There were injections in the upper trapezius and levator scapula muscles on both sides and one in the teres major muscle on the left.  A total of 160 units of Botox was used in divided doses.    The patient tolerated the procedure well.  The patient was taken to the recovery area after the injection.  There were no complications.      If Jessika Hunt has any questions or concerns after discharge, she was instructed to call us.

## 2021-06-18 NOTE — PROGRESS NOTES
Injection - Other  Date/Time: 6/13/2018 9:50 AM  Performed by: EUNICE MAGANA  Authorized by: EUNICE MAGANA   Comments:     TRIGGER POINT INJECTION  Performed on: 6/13/2018    Ms. Hunt is a 58-year-old woman who has myofascial pain involving the neck and shoulders.  She also gets Botox injections which help with her migraine headaches.  Today she is here for trigger point injections.    Pre Procedure Diagnosis:  Myofascial pain  Vital Signs:  As per intra-procedure documentation    The procedure was discussed with Jessika Hunt in detail along with the attendant risks, including but not limited to: nerve injury, infection, bleeding, allergic reaction, or worsening of pain.  Informed consent was obtained and patient elected to proceed.    After informed consent was obtained the patient was seated in examination chair.  The neck and shoulder areas were prepped sterilely.  A 1 1/4 inch #27-gauge needle was used.  There were injections made in the upper sternocleidomastoid muscles near the attachment to the mastoid on both sides.  There are also injections made in the upper cervical paraspinal muscles bilaterally near the attachment to the scapula.  Injections were also made in the upper trapezius muscles and levator scapula muscles bilaterally.  A total of 10 mL of 0.25% Marcaine and 10 mg of Decadron was used in divided doses.    The patient tolerated the procedure well.  The patient was taken to the recovery area after the injection.  There were no complications.      Pre Procedure Pain Scale: 6  Pain Score:   7    If Jessika Hunt has any questions or concerns after discharge, she was instructed to call us.

## 2021-06-21 NOTE — LETTER
Letter by Deepika Hernández, Genetic Counselor at      Author: Deepika Hernández, Genetic Counselor Service: -- Author Type: --    Filed:  Encounter Date: 3/1/2021 Status: (Other)       Variant of Uncertain Significance (VUS) Handout    Genetic Testing  You had a blood test that looked at the genetic information in one or more genes associated with increased cancer risk.  The testing looked for any harmful changes that would stop this particular gene from working like it should.  It is important to remember that everyone has variants in multiple genes in their bodies that do not affect how the gene works.  These changes are benign and do not cause disease or increase cancer risk.  The term often used to describe these variants is benign polymorphism.  If an individual is found to have a benign polymorphism, their genetic test result is reported as Negative.    Results  There are three possible results of any genetic test:    Positive--a harmful mutation was identified    Negative--no mutation was identified    Variant of unknown significance--a variation in one of the genes was identified, but it is unclear how this impacts cancer risk in the family    Variant of Unknown Significance (VUS)  A VUS was identified in your blood sample.  Scientists currently do not know if this variant is harmful or if it is a benign polymorphism not associated with any increased risk of cancer.   There are several reasons for this lack of knowledge, but likely your VUS has either never been seen before or has only been seen in a small number of individuals.  Until your VUS is studied in more detail and/or seen in more families, scientists are not able to predict if it is a harmful mutation or a benign polymorphism.  Therefore, the cause of the cancer in you and your relatives is still unknown.          Reclassification  Genetic testing laboratories and researchers are constantly working to determine the importance of variants of unknown  significance.  Most laboratories will notify the genetic counselor when a patients VUS has been reclassified as a harmful mutation or a benign polymorphism.      Some families may be candidates for participation in the laboratorys variant research programs to help determine the importance of their VUS.  Only the testing laboratory can decide who is eligible for participation.  Participating in these programs does not guarantee that families will learn the significance of their VUS immediately.  It could be months or years before a VUS is reclassified.       Screening Recommendations  Cancer screening recommendations for patients with a VUS should be based on their personal and family history rather than the VUS itself.  This may include increased cancer screening for certain individuals in the family.  Your genetic counselor and health care provider can help make appropriate recommendations for you and your relatives.      It is usually not recommended that other relatives have genetic testing for the VUS unless it is done as part of the [employer] variant research program because an individuals test results should not influence their cancer screening until we determine the importance of the VUS.  Your genetic counselor can help you and your relatives understand the risks and benefits of all genetic testing and cancer screening options.    Please call us if you have any questions or concerns.     [facility]    Deepika Hernández Ms, [facility]   [phone]

## 2021-06-21 NOTE — LETTER
"Letter by Deepika Hernández, Genetic Counselor at      Author: Deepika Hernández, Genetic Counselor Service: -- Author Type: --    Filed:  Encounter Date: 2/12/2021 Status: (Other)         Karla Darling DO  [address] [address]                                  February 12, 2021    Patient: Jessika Hunt   MR Number: [id number]   YOB: 1960   Date of Visit: 2/12/2021     Dear Dr. Darling, DO:    Thank you for referring Jessika Hunt to me for evaluation. Below are the relevant portions of my assessment and plan of care.    If you have questions, please do not hesitate to call me. I look forward to following Jessika along with you.    Sincerely,        Deepika Hernández, Genetic Counselor            BONNIE Leal Anna R, Genetic Counselor  2/12/2021  5:01 PM  Sign when Signing Visit  2/12/2021     Jessika Hunt is a 60 y.o. female who is being evaluated via a billable telephone visit.      The patient has been notified of following:     \"This telephone visit will be conducted via a call between you and your physician/provider. We have found that certain health care needs can be provided without the need for a physical exam.  This service lets us provide the care you need with a short phone conversation.  If a prescription is necessary we can send it directly to your pharmacy.  If lab work is needed we can place an order for that and you can then stop by our lab to have the test done at a later time.    Telephone visits are billed at different rates depending on your insurance coverage. During this emergency period, for some insurers they may be billed the same as an in-person visit.  Please reach out to your insurance provider with any questions.    If during the course of the call the physician/provider feels a telephone visit is not appropriate, you will not be charged for this service.\"    Patient has given verbal consent to a Telephone visit? Yes    What " phone number would you like to be contacted at? [phone]    Patient would like to receive their AVS by AVS Preference: Gael.    Referring Provider: Melody Rubalcava MD    Present for Today's Visit: Jessika    Presenting Information:   I spoke with Jessika Hunt over the phone today for genetic counseling to discuss her personal and family history of breast cancer.  She is here today to review this history, cancer screening recommendations, and available genetic testing options.    Personal History:  Jessika is a 60 y.o. female. She was diagnosed with a right breast cancer picked up on a screening mammogram. Biopsy completed on 1/27/2021 revealed an invasive ductal carcinoma; ER positive, WA negative, and HER2 negative. She has met with Dr. Darling to discuss surgical options. Jessika stated that the results from genetic testing will help determine whether bilateral mastectomy is the right surgical option for her. She has met with Dr. Kemp to discuss possible radiation therapy. She has an upcoming appointment with Dr. Odonnell in plastic surgery on 2/16. She shared that she has breast implants.     She also reports a history of a melanoma removed from her left arm at age 55. She follows with dermatology every 6 months.       She had her first menstrual period at age 18, her first child at age 42, and is postmenipausal.  Jessika has her ovaries, fallopian tubes and uterus in place, and she has had no ovarian cancer screening to date.  She reports past history of oral contraceptive use from ages [age] and that she was on hormone replacement therapy for about 5 years.      Her most recent OB-GYN exam and Pap smear about 3 years ago were normal.  She has annual mammograms and has extremely dense breasts.  She began having colonoscopies at the age of 51. Her first and most recent colonoscopy at age 51 was normal and follow-up was recommended in 10 years. She reports that she has a colonoscopy scheduled for April.  She does not  regularly do any other cancer screening at this time.  Jessika reported former tobacco use (20's-30's; social), and 0-2 drinks per week for alcohol use.    Family History: Jessika's family history is significant for the following (Please see scanned pedigree for detailed family history information)  Children/Siblings    Jessika has one biological daughter, age 18, who is healthy with cancer history. Jessika has one adopted daughter, age 16, healthy.     Jessika has a sister, age 67, with a history of a melanoma near her eye diagnosed at age 67.     Jessika has a sister, age 63, with no reported cancer history.     Jessika has a sister, age 58, with no reported cancer history.   Maternal    Jessika's mother passed away at age 86 from complications due to progressive frontal lobe aphasia with no reported cancer history.     Jessika has five maternal uncles. One of her maternal uncles passed at age 18 in a car accident; no cancer history. Three additional uncles have passed in their 70's/80's from lung issues (emphysema/lung cancer; all smokers).    No cancer history reported in either of her maternal grandparents or any of her maternal first-cousins.    Patenral    Jessika's father passed away at age 79 from pancreatic cancer diagnosed at age 79 with a history of prostate cancer diagnosed at age 78.     Jessika has one paternal aunt who passed in her 40's from an unknown cause.     No cancer history reported in either of her paternal grandparents. Jessika has no paternal first-cousins.       Her maternal ethnicity is English and [place]ese. Her paternal ethnicity is Burmese.  There is no known Ashkenazi Zoroastrian ancestry on either side of her family. There is no reported consanguinity.    Discussion:    Jessika's personal history of breast cancer and melanoma and family history of pancreatic and prostate cancer and melanoma is suggestive of a hereditary cancer syndrome.    We reviewed the features of sporadic, familial, and hereditary cancers. In  looking at Jessika's family history, it is possible that a cancer susceptibility gene is present due to her breast cancer diagnosis, her father's pancreatic and prostate cancer diagnoses, and her sister's melanoma.    We discussed the natural history and genetics of hereditary cancers. A detailed handout regarding the information we discussed will be sent to Jessika via [email or web address]. Topics included: inheritance pattern, cancer risks, cancer screening recommendations, and also risks, benefits and limitations of testing.    We reviewed that the most common cause of hereditary breast cancer is Hereditary Breast and Ovarian Cancer (HBOC) syndrome, which is caused by mutations in the genes BRCA1 and BRCA2. BRCA1 and BRCA2 are two genes that increase the risk for breast and ovarian cancers, among others. Women who inherit a BRCA mutation have a 50 to 85% lifetime risk of breast cancer and up to a 40-58% lifetime risk of ovarian cancer. This is higher than the general population lifetime risks of 12% for breast cancer and less than 2% for ovarian cancer. Men with BRCA gene mutations have up to a 7% risk of breast cancer and 20% risk of prostate cancer. Other cancers, such as pancreatic cancer and melanoma, have also been associated with BRCA mutations.    Based on her personal and family history, Jessika meets current National Comprehensive Cancer Network (NCCN) criteria for genetic testing of high-penetrance breast and/or ovarian cancer susceptibility genes.      We discussed that there are additional genes that could cause increased risk for breast and other cancers. As many of these genes present with overlapping features in a family and accurate cancer risk cannot always be established based upon the pedigree analysis alone, it would be reasonable for Jessika to consider panel genetic testing to analyze multiple genes at once.    We discussed genetic testing options for Jessika given her personal and family history including genes  associated with increased risk for breast cancer (BRCANext-Expanded) and adding genes associated with pancreatic cancer risk and melanoma risk given her and her sister's melanoma history and her father's pancreatic cancer history (CustomNext-Cancer; CancerNext + MelanomaNext; 40 genes). Jessika was interested in learning as much as possible from this testing given her family history. She opted to proceed with genetic testing via the CustomNext-Cancer panel through [employer].   Genetic testing is available for 40 genes associated with hereditary cancer: CustomNext-Cancer (APC, SHANNAN, AXIN2, BAP1, BARD1, BMPR1A, BRCA1, BRCA2, BRIP1, CDH1, CDK4, CDKN2A, CHEK2, DICER1, EPCAM, GREM1, HOXB13, MITF, MLH1, MSH2, MSH3, MSH6, MUTYH, NBN, NF1, NTHL1, PALB2, PMS2, POLD1, POLE, POT1, PTEN, RAD51C, RAD51D, RB1, RECQL, SMAD4, SMARCA4, STK11, and TP53).  We discussed that many of the genes in the CustomNext-Cancer panel are associated with specific hereditary cancer syndromes and published management guidelines: Hereditary Breast and Ovarian Cancer syndrome (BRCA1, BRCA2), Pena syndrome (MLH1, MSH2, MSH6, PMS2, EPCAM), Familial Adenomatous Polyposis (APC), Hereditary Diffuse Gastric Cancer (CDH1), Familial Atypical Multiple Mole Melanoma syndrome (CDK4, CDKN2A), Juvenile Polyposis syndrome (BMPR1A, SMAD4), Cowden syndrome (PTEN), Li Fraumeni syndrome (TP53), Peutz-Jeghers syndrome (STK11), MUTYH Associated Polyposis (MUTYH), Neurofibromatosis type 1 (NF1), Hereditary Retinoblastoma (RB1).   The SHANNAN, AXIN2, BRIP1, CHEK2, GREM1, MSH3, NBN, NTHL1, PALB2, POLD1, POLE, RAD51C, and RAD51D genes are associated with increased cancer risk and have published management guidelines for certain cancers.    The remaining genes (BARD1, BAP1, DICER1, HOXB13, MITF, POT1, RECQL, and SMARCA4) are associated with increased cancer risk and may allow us to make medical recommendations when mutations are identified.      Consent was obtained over the  phone with no witness required due to the current covid19 global pandemic.    Medical Management: For Jessika, we reviewed that the information from genetic testing may determine:    surgery to treat Jessika's active cancer diagnosis (i.e. lumpectomy versus bilateral mastectomy, partial versus total colectomy, etc.),    additional cancer screening for which Jessika may qualify (i.e. mammogram and breast MRI, more frequent colonoscopies, more frequent dermatologic exams, etc.),    options for risk reducing surgeries Jessika could consider (i.e. bilateral mastectomy, surgery to remove her ovaries and/or uterus, etc.),      and targeted chemotherapies for Jessika's active cancer, or if she were to develop certain cancers in the future (i.e. immunotherapy for individuals with Pena syndrome, PARP inhibitors, etc.).     These recommendations and possible targeted chemotherapies will be discussed in detail once genetic testing is completed.    I explained that Jessika will be contacted by our scheduling team to set up a lab appointment to get her blood drawn for genetic testing at either [facility] cancer care lab or [facility] cancer care lab at her earliest convenience.     We discussed that Jessika's surgical decisions are pending genetic testing results. For that reason, I will place a STAT request on the BRCAPlus genes (SHANNAN, BRCA1, BRCA2, CDH1, CHEK2, PALB2, PTEN, and TP53) in order to get these results back as soon as possible.      Plan:  1) Today Jessika elected to proceed with CustomNext-Cancer genetic testing (40 genes) through [facility].  2) A STAT request was placed on the BRCAPlus genes (including BRCA1 and BRCA2) and we will get these results back in 7-10 days. I will call Jessika with these results as soon as they are available.     Time spent over the phone: 57 minutes    Deepika Hernández MS, [facility] [place]  Licensed Genetic Counselor  [facility]  [phone]

## 2021-06-21 NOTE — LETTER
Letter by Criselda Coleman RN at      Author: Criselda Coleman RN Service: -- Author Type: --    Filed:  Encounter Date: 2/4/2021 Status: (Other)       Dear Jessika:    Thank you for choosing [facility] for your care.  We are committed to providing you with the highest quality and compassionate healthcare services.  The following information pertains to your first appointment with our clinic.    Date/Time of appointment: Tuesday, February 9, 2021--please be available approximately 12:45 for your 1:00 nurse and 1:30 consult with Dr. Kemp    Name of your Physician: Marge Kemp MD     What to do to prepare for your appointment:    Please go to this link for technical questions and preparation [email or web address]     Attached is a Patient History/Initial Nursing Assessment form the nurse will be reviewing with you.  If at all possible, please complete this and send it to me by e-mail in advance of your appointment ([email or web address]).    Please have your current insurance card(s) handy.    We hope these instructions are helpful to you.  If you have any questions or concerns, please call us at [phone].  It is our pleasure to assist you.    Warm Regards,  Criselda Coleman  Nurse Navigator  [phone]

## 2021-06-21 NOTE — LETTER
Letter by Deepika Hernández, Genetic Counselor at      Author: Deepika Hernández, Genetic Counselor Service: -- Author Type: --    Filed:  Encounter Date: 2/12/2021 Status: (Other)       [facility]  Hereditary Breast and Gynecologic Cancers  Assessing Cancer Risk  Only about 5-10% of cancers are thought to be due to an inherited cancer susceptibility gene.    These families often have:    Several people with the same or related types of cancer    Cancers diagnosed at a young age (before age 50)    Individuals with more than one primary cancer    Multiple generations of the family affected with cancer    Some people may be candidates for genetic testing of more than one gene.  For these families, genetic testing using a cancer panel may be offered.  These panels will test different genes known to increase the risk for breast, ovarian, uterine, and/or other cancers. All of the genes discussed below have published clinical management guidelines for individuals who are found to carry a mutation. The purpose of this handout is to serve as a brief summary of the genes analyzed by the panels used to inquire about hereditary breast and gynecologic cancer:  SHANNAN, BRCA1, BRCA2, BRIP1, CDH1, CHEK2, MLH1, MSH2, MSH6, PMS2, EPCAM, PTEN, PALB2, RAD51C, RAD51D, and TP53.  ______________________________________________________________________________  Hereditary Breast and Ovarian Cancer Syndrome   (BRCA1 and BRCA2)  A single mutation in one of the copies of BRCA1 or BRCA2 increases the risk for breast and ovarian cancer, among others.  The risk for pancreatic cancer and melanoma may also be slightly increased in some families.  The chart below shows the chance that someone with a BRCA mutation would develop cancer in his or her lifetime1,2,3,4.      Lifetime Cancer Risks    General Population BRCA   Breast 12% ~80%   Ovarian 1-2% 11-40%   Male Breast <1% 7-8%   Prostate 16% 20%       A persons ethnic background is  also important to consider, as individuals of Ashkenazi Gnosticism ancestry have a higher chance of having a BRCA gene mutation.  There are three BRCA mutations that occur more frequently in this population.    Pena Syndrome   (MLH1, MSH2, MSH6, PMS2, and EPCAM)  Currently five genes are known to cause Pena Syndrome: MLH1, MSH2, MSH6, PMS2, and EPCAM.  A single mutation in one of the Pena Syndrome genes increases the risk for colon, endometrial, ovarian, and stomach cancers.  Other cancers that occur less commonly in Pena Syndrome include urinary tract, skin, and brain cancers.  The chart below shows the chance that a person with Pena syndrome would develop cancer in his or her lifetime5.      Lifetime Cancer Risks    General Population Pena Syndrome   Colon 5.5% ~80%   Endometrial 2.7% 15-60%   Stomach <1% 1-13%   Ovarian 1-2% 4-24%       Cowden Syndrome   (PTEN)  Cowden syndrome is a hereditary condition that increases the risk for breast, thyroid, endometrial, colon, and kidney cancer.  Cowden syndrome is caused by a mutation in the PTEN gene.  A single mutation in one of the copies of PTEN causes Cowden syndrome and increases cancer risk.  The chart below shows the chance that someone with a PTEN mutation would develop cancer in their lifetime6,7.  Other benign features seen in some individuals with Cowden syndrome include benign skin lesions (facial papules, keratoses, lipomas), learning disability, autism, thyroid nodules, colon polyps, and larger head size.      Lifetime Cancer Risks    General Population Cowden Synrome   Breast 12% 25-50%   Thyroid 1% Up to 35%   Renal 1-2% Up to 35%   Endometrial 2.7% Up to 28%   Colon  5.5% 9%   Melanoma 2-3% 6%   ** One recent study found breast cancer risk to be increased to 85%     Li-Fraumeni Syndrome   (TP53)  Li-Fraumeni Syndrome (LFS) is a cancer predisposition syndrome caused by a mutation in the TP53 gene. A single mutation in one of the copies of TP53 increases  the risk for multiple cancers. Individuals with LFS are at an increased risk for developing cancer at a young age. The lifetime risk for development of a LFS-associated cancer is 50% by age 30 and 90% by age 60.   Core Cancers: Sarcomas, Breast, Brain, Lung, Leukemias/Lymphomas, Adrenocortical carcinomas  Other Cancers: Gastrointestinal, Thyroid, Skin, Genitourinary    Hereditary Diffuse Gastric Cancer   (CDH1)  Currently, one gene is known to cause hereditary diffuse gastric cancer (HDGC): CDH1.  Individuals with HDGC are at increased risk for diffuse gastric cancer and lobular breast cancer. Of people diagnosed with HDGC, 30-50% have a mutation in the CDH1 gene.  This suggests there are likely other genes that may cause HDGC that have not been identified yet.      Lifetime Cancer Risks    General Population HDGC    Diffuse Gastric  <1% ~80%   Breast 12% 39-52%         Additional Genes  SHANNAN  SHANNAN is a moderate-risk breast cancer gene. Women who have a mutation in SHANNAN can have between a 2-4 fold increased risk for breast cancer compared to the general population8. SHANNAN mutations have also been associated with increased risk for pancreatic cancer, however an estimate of this cancer risk is not well understood9. Individuals who inherit two SHANNAN mutations have a condition called ataxia-telangiectasia (AT).  This rare autosomal recessive condition affects the nervous system and immune system, and is associated with progressive cerebellar ataxia beginning in childhood.  Individuals with ataxia-telangiectasia often have a weakened immune system and have an increased risk for childhood cancers.    PALB2  Mutations in PALB2 have been shown to increase the risk of breast cancer up to 33-58% in some families; where individuals fall within this risk range is dependent upon family dspaglu15. PALB2 mutations have also been associated with increased risk for pancreatic cancer, although this risk has not been quantified yet.   Individuals who inherit two PALB2 mutations--one from their mother and one from their father--have a condition called Fanconi Anemia.  This rare autosomal recessive condition is associated with short stature, developmental delay, bone marrow failure, and increased risk for childhood cancers.    CHEK2   CHEK2 is a moderate-risk breast cancer gene.  Women who have a mutation in CHEK2 have around a 2-fold increased risk for breast cancer compared to the general population, and this risk may be higher depending upon family history.11,12,13 Mutations in CHEK2 have also been shown to increase the risk of a number of other cancers, including colon and prostate, however these cancer risks are currently not well understood.    BRIP1, RAD51C and RAD51D  Mutations in BRIP1, RAD51C, and RAD51D have been shown to increase the risk of ovarian cancer and possibly female breast cancer as well14,15 .       Lifetime Cancer Risk    General Population BRIP1 RAD51C RAD51D   Ovarian 1-2% ~5-8% ~5-9% ~7-15%         Inheritance  All of the cancer syndromes reviewed above are inherited in an autosomal dominant pattern.  This means that if a parent has a mutation, each of his or her children will have a 50% chance of inheriting that same mutation.  Therefore, each child--male or female--would have a 50% chance of being at increased risk for developing cancer.    Mutations in some genes can occur de víctor, which means that a persons mutation occurred for the first time in them and was not inherited from a parent.  Now that they have the mutation, however, it can be passed on to future generations.    Genetic Testing  Genetic testing involves a blood test and will look at the genetic information in the SHANNAN, BRCA1, BRCA2, BRIP1, CDH1, CHEK2, MLH1, MSH2, MSH6, PMS2, EPCAM, PTEN, PALB2, RAD51C, RAD51D, and TP53 genes for any harmful mutations that are associated with increased cancer risk.  If possible, it is recommended that the person(s) who has  had cancer be tested before other family members.  That person will give us the most useful information about whether or not a specific gene is associated with the cancer in the family.    Results  There are three possible results of genetic testing:    Positive--a harmful mutation was identified in one or more of the genes    Negative--no mutation was identified in any of the genes on this panel    Variant of unknown significance--a variation in one of the genes was identified, but it is unclear how this impacts cancer risk in the family    Advantages and Disadvantages   There are advantages and disadvantages to genetic testing.    Advantages    May clarify your cancer risk    Can help you make medical decisions    May explain the cancers in your family    May give useful information to your family members (if you share your results)    Disadvantages    Possible negative emotional impact of learning about inherited cancer risk    Uncertainty in interpreting a negative test result in some situations    Possible genetic discrimination concerns (see below)    Genetic Information Nondiscrimination Act (NO)  NO is a federal law that protects individuals from health insurance or employment discrimination based on a genetic test result alone.  Although rare, there are currently no legal discrimination protections in terms of life insurance, long term care, or disability insurances.  Visit the National Human Genome Research [place] website to learn more.    Reducing Cancer Risk  All of the genes described above have nationally recognized cancer screening guidelines that would be recommended for individuals who test positive.  In addition to increased cancer screening, surgeries may be offered or recommended to reduce cancer risk.  Recommendations are based upon an individuals genetic test result as well as their personal and family history of cancer.    Questions to Think About Regarding Genetic Testing:    What  effect will the test result have on me and my relationship with my family members if I have an inherited gene mutation?  If I dont have a gene mutation?    Should I share my test results, and how will my family react to this news, which may also affect them?    Are my children ready to learn new information that may one day affect their own health?    Hereditary Cancer Resources    FORCE: Facing Our Risk of Cancer Empowered facingourrisk.org   Bright Pink bebrightpink.org   Li-Fraumeni Syndrome Association lfsassociation.org   PTEN World PTENworld.com   No stomach for cancer, Inc. nostomachforcancer.org   Stomach cancer relief network Scrnet.org   Collaborative Group of the Americas on Inherited Colorectal Cancer (CGA) cgaicc.com    Cancer Care cancercare.org   American Cancer Society (ACS) cancer.org   National Cancer [place] (NCI) cancer.gov     Please call us if you have any questions or concerns.   Cancer Risk Management Program  q Yandel White, MS, [facility] [phone]  q Paulette Santiago, MS, [facility] [phone]  q Ploly Garcia, MS, [facility] [phone]  q Kerri Erickson, MS, [facility] [phone]  q Deepika Hernández, MS, [facility] [phone]  q Chirag Bella, MS, [facility] [phone]  q Ivanna Peres, MS, [facility] [phone]      References  1. Everardo Gaston PDP, Julito S, Darrell RODRIGUEZ, Kasandra JE, Elissa JL, Indiraman N, Noel H, Marisel O, Shey A, Dhiraj B, Miroslava P, Tien S, Karen DM, Au N, Ginette E, Sridhar H, Roger E, Yairinski J, Gronnaveen J, Lena B, Drew H, Mickeylakari S, Franchesca H, Emi H, Felix K, Sarah OP. Average risks of breast and ovarian cancer associated with BRCA1 or BRCA2 mutations detected in case series unselected for family history: a combined analysis of 222 studies. Am J Hum Veronica. 2003;72:1117-30.  2. Claudio Ahujay M, Jose Alfredo AL.  BRCA1 and BRCA2 Hereditary Breast and Ovarian Cancer. Gene Reviews online. 2013.  3. Param YC, Mehreen S, Chadwick G, Margarita S. Breast cancer risk among male BRCA1 and  BRCA2 mutation carriers. J Natl Cancer Inst. 2007;99:1811-4.  4. Víctor OTT, Kameron I, En J, Sherry E, Helen ER, Fred F. Risk of breast cancer in male BRCA2 carriers. J Med Veronica. 2010;47:710-1.  5. National Comprehensive Cancer Network. Clinical practice guidelines in oncology, colorectal cancer screening. Available online (registration required). 2015.  6. Burt MH, Arlene J, Daquan J, Tarsha MACKAY, Buffy MS, Carolina C. Lifetime cancer risks in individuals with germline PTEN mutations. Clin Cancer Res. 2012;18:400-7.  7. Pilarski R. Cowden Syndrome: A Critical Review of the Clinical Literature. J Veronica . 2009:18:13-27.  8. Truong DUDLEY, Robbie D, Augusto S, Maryuri P, Dorothy T, Jona M, Pablo B, Chey H, Derrick R, Lv K, Terrence L, Víctor OTT, Karen D, Luis F DF, Rick MR, The Breast Cancer Susceptibility Collaboration ([place]) & Yusef MITCHELL. SHANNAN mutations that cause ataxia-telangiectasia are breast cancer susceptibility alleles. Nature Genetics. 2006;38:873-875  9. Chandra N , Kevin Y, Fernanda J, Thea L, Nahun GM , Tino ML, Gallinger S, Clay AG, Syngal S, Raquel ML, Mansoor J , Bobby R, Indra SZ, Esciara JR, Taylor VE, Diego M, Vogelstein B, Melissa N, Shirazn RH, Rosalinda KW, and Aleman AP. SHANNAN mutations in patients with hereditary pancreatic cancer. Cancer Discover. 2012;2:41-46  10. Joseline FARIA, et al. Breast-Cancer Risk in Families with Mutations in PALB2. NEJM. 2014; 371(6):497-506.  11. CHEK2 Breast Cancer Case-Control Consortium. CHEK2*1100delC and susceptibility to breast cancer: A collaborative analysis involving 10,860 breast cancer cases and 9,065 controls from 10 studies. Am J Hum Veronica, 74 (2004), pp. 6453-1484  12. Eugenio T, Brandy S, Alisha K, et al. Spectrum of Mutations in BRCA1, BRCA2, CHEK2, and TP53 in Families at High Risk of Breast Cancer. MARIE. 2006;295(12):2849-5239.   13. Jeanette ARGUETA, Gabriel YAO, Diandra DUDLEY, et al. Risk of breast cancer in women with a  CHEK2 mutation with and without a family history of breast cancer. J Clin Oncol. 2011;29:9016-1071.  14. Pedro H, Yumiko E, Heri SJ, et al. Contribution of germline mutations in the RAD51B, RAD51C, and RAD51D genes to ovarian cancer in the population. J Clin Oncol. 2015;33(26):8867-5006. Doi:10.1200/JCO.2015.61.2408.  15. Andreia T, Alie LOPEZ, Stoney P, et al. Mutations in BRIP1 confer high risk of ovarian cancer. Jackie Veronica. 2011;43(11):5457-7282. doi:10.1038/ng.955.

## 2021-06-21 NOTE — PROGRESS NOTES
Injection - Other  Date/Time: 10/31/2018 10:45 AM  Performed by: EUNICE MAGANA  Authorized by: EUNICE MAGANA   Comments:     BOTOX INJECTION FOR CHRONIC INTRACTABLE MIGRAINE HEADACHES WITH MUSCLE SPASM  Performed on: 10/31/2018    Ms. Hunt is a 58-year-old woman who has chronic intractable migraine headaches with muscle spasm that progresses and to her neck and shoulders.  She has had good relief of these with Botox injections.  Her last set of injections was about 6 months ago.  Over the last couple months she has been having gradually increasing headaches again and wishes to repeat the injection.    Pre Procedure Diagnosis:  Chronic Intractable Migraine Headaches  Vital Signs:  As per intra-procedure documentation    The procedure was discussed with Jessika Hunt in detail along with the attendant risks, including but not limited to: nerve injury, infection, bleeding, allergic reaction, or worsening of pain.  Informed consent was obtained and patient elected to proceed.    After informed consent was obtained, the patient was seated in the examination chair.  The forehead, temples occipital and cervical areas were prepped sterily with alcohol.  A one inch #30 gauge needle was used.  Botox, 160 units, was diluted with 2 ml of normal saline.    Injections were made in:     Upper frontalis muscle at hairline - 30 units in 6 sites  Occipitalis muscle bilateral - 10 units in 2 sites  Temporalis muscle bilateral - 40 units in 8 sites  Posterior auricular muscle bilateral - 10 units in 2 sites  Upper cervicle paraspinal muscles bilateral -  10 units in 2 sites  Upper trapezius muscles bilateral - 20 units in 4 sites  Levator scapula muscles bilateral - 20 units in 4 sites  Teres major muscles bilateral - 20 units in 2 sites     The 160 units total were injected in divided doses.  There were no complications.    The patient tolerated the procedure well.  There were no complications.      Felisa DUDLEY  Marilee has any questions or concerns after discharge, she was instructed to call us.

## 2021-06-21 NOTE — LETTER
Letter by Criselda Coleman RN at      Author: Criselda Coleman RN Service: -- Author Type: --    Filed:  Encounter Date: 3/26/2021 Status: (Other)       Dear Jessika:     Thank you for choosing [facility] for your care.  We are committed to providing you with the highest quality and compassionate healthcare services.  The following information pertains to your first medical oncology appointment with our clinic.    Date/Time of appointment: Thursday, April 15, 2021--please arrive no later than 12:40 for your 1:00pm consult with Dr. Albert (12:45 nurse)    Name of your Physician: Jonathan Albert MD (Medical Oncology, 2nd floor)    What to bring to your appointment:    Your current insurance card(s).    At this moment you are allowed to bring one visitor to your consult.  Due to COVID-19 surges this may change, but hopefully not!    Parking:    Please refer to the map included to direct you.  The [facility] is located at the [address] end of [facility] in [place], [place].      After turning onto [address] from [facility], take a right turn at the first stop sign.  We have designated parking on the left, identified as parking for Cancer Care patients (Lot D).     The Code to Enter Lot D is: 0401. This code changes monthly and will always coincide with the current month followed by 01. For example August will be 0801.  The month will continue to change but the 01 will remain constant.  If lot D is full please use Parking Lot A, directly across the street.    Please enter the Cancer Center on the north end of the [facility].  You will see a sign on the building.        For Medical Oncology appointments, please take the elevator to the second floor to check in.   We hope these instructions are helpful to you.  If you have any questions or concerns, please call us at [phone].  It is our pleasure to assist you.    Warm Regards,      Criselda  Jared RN, BSN, OCN, CBCN  Cancer Care Navigator  [phone]

## 2021-06-21 NOTE — LETTER
Letter by Deepika Hernández, Genetic Counselor at      Author: Deepika Hernández, Genetic Counselor Service: -- Author Type: --    Filed:  Encounter Date: 3/1/2021 Status: (Other)         Jessika Hunt  [address] [place] [address]      March 1, 2021      Dear Ms. Hunt,    It was a pleasure speaking with you on the phone on 3/1/2021.  Here is a copy of the progress note from our discussion.  If you have any additional questions, please feel free to call.    Referring Provider: Dr. Darling    Presenting Information:  I spoke with Jessika to discuss her genetic test results.  Her blood was drawn on 2/16/2021.  [facility] (CancerNext + MelanomaNext; 40 genes) testing was ordered from [facility]. This testing was done because of her personal history of breast cancer and melanoma and family history of pancreatic and prostate cancer and melanoma.    Genetic Testing Result: Multiple Variants of Uncertain Significance (VUS)  Jessika was found to have 2 variants of uncertain significance (VUS).      BRCA1 p.U3529A (c.3722C>A)      PMS2 p.V251M (c.751G>A)    No other variants or mutations were found in these genes. Given the uncertain significance of this result, medical management decisions should NOT be made based on this test result alone.    Of note, Jessika is negative for mutations in the APC, SHANNAN, AXIN2, BAP1, BARD1, BMPR1A,BRCA1, BRCA2, BRIP1, CDH1, CDK4, CDKN2A, CHEK2, DICER1, MLH1, MSH2, MSH3, MSH6, MUTYH, NBN, NF1, NTHL1, PALB2,PMS2, POT1, PTEN, RAD51C, RAD51D, RB1, RECQL, SMAD4, SMARCA4, STK11 and TP53 (sequencing and deletion/duplication);HOXB13, MITF, POLD1 and POLE (sequencing only); EPCAM and GREM1 (deletion/duplication only) genes. No mutations were found in any of the 40 genes analyzed.     Testing did not detect an identifiable mutation associated with Hereditary Breast and Ovarian Cancer syndrome (BRCA1, BRCA2), Pena syndrome (MLH1, MSH2, MSH6, PMS2, EPCAM), Familial Adenomatous  "Polyposis (APC), Hereditary Diffuse Gastric Cancer (CDH1), Familial Atypical Multiple Mole Melanoma syndrome (CDK4, CDKN2A), Juvenile Polyposis syndrome (BMPR1A, SMAD4), Cowden syndrome (PTEN), Li Fraumeni syndrome (TP53), Peutz-Jeghers syndrome (STK11), MUTYH Associated Polyposis (MUTYH), Neurofibromatosis type 1 (NF1), Hereditary Retinoblastoma (RB1).     A copy of the test report can be found in the Media tab and named \"Genetics Scan- [facility]\". The report is scanned in as a linked document.    Interpretation:  We discussed several different interpretations of this inconclusive test result. It is not clear if any of these variants are associated with increased cancer risk. These variants may be benign changes that do not increased cancer risk, or they may be harmful mutations that cause increased risk for certain cancers.    BRCA1 p.K6990Y (c.3722C>A)  We discussed that pathogenic (harmful) mutations in the BRCA1 gene increase the risk for breast and ovarian cancers, among others. Women who inherit a BRCA1 mutation have a 50 to 85% lifetime risk of breast cancer and up to a 40-58% lifetime risk of ovarian cancer. This is higher than the general population lifetime risks of 12% for breast cancer and less than 2% for ovarian cancer. Men with BRCA1 gene mutations have an increased risk for male breast cancer and prostate cancer. Other cancers, such as pancreatic cancer and melanoma, have also been associated with BRCA1 mutations.    We discussed that there are specific screening and management recommendations for those with pathogenic (harmful) mutations in the BRCA1 gene. I reiterated that, at this time, the variant found in the BRCA1 gene for Jessika is considered a variant of uncertain significance and her medical management will NOT change based on this result alone.     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  Pena syndrome may " present with polyps, but typically a small number. The highest cancer risks associated with Pena syndrome include colon cancer, endometrial/uterine cancer, gastric cancer, and ovarian cancer.    We discussed that there are specific screening and management recommendations for those with mutations in the PMS2 gene. I reiterated that, at this time, the variant found in the PMS2 gene for Jessika is considered a variant of uncertain significance and her medical management will NOT change based on this result alone.     In rare situations in which both parents have a mutation in the PMS2 gene, their children each have a 25% risk for constitutional mismatch repair deficiency syndrome (CMMRD).  CMMRD is a disorder that causes cafe-au-lait spots and risk for childhood cancers. If this variant is later classified as harmful, Jessika would be considered a carrier for CMMRD. In that case, genetic counseling and genetic testing may be advised for her partner.    The laboratory is working to determine if any of these variants are harmful or benign, and they will contact me if any of them are reclassified. If these variants are determined to be benign, there may be a different gene or combination of genes and environment that are associated with the cancers in Jessika and/or her relatives.    It is also important to consider that her other relatives with cancer history may have had a mutation in one of the genes tested and she did not inherit it.  There is also a small possibility that there is a mutation in one of these genes and we could not find it with our current testing methods.       Inheritance:  We reviewed the autosomal dominant inheritance of the variants in the BRCA1 and PMS2 genes.  We discussed that Jessika has a 50% chance to pass each of these variants to each of her children. Likewise, there is a 50% chance for each of these variants to be present in each of her siblings. Because it is unclear what, if any, risk is  associated with these variants, clinical genetic testing for these BRCA1 and PMS2 variants alone is not recommended for relatives.    Family Studies:  The laboratory may offer additional research based testing for specific relatives in order to help reclassify these variants.    Screening:  Based on this inconclusive test result, it is important for Jessika and her relatives to refer back to the family history for appropriate cancer screening.      Jessika should continue to follow her oncology team's recommendations for the treatment and follow-up for her breast cancer history.  We did discuss that fact that Jessika is currently deciding which breast surgery will be right for her. She shared that she is leaning solidly towards bilateral mastectomies. I shared that, while 90% of variants of uncertain significance get downgraded to a benign (non-harmful) variant, it is possible that this BRCA1 variant of uncertain significance is a pathogenic (harmful) mutation. We should consider the possibility that this variant of uncertain significance could truly be a pathogenic mutation when it comes to her breast surgery decision. It would be reasonable for Jessika to consider bilateral mastectomies, especially if this is the way she was leaning already, but these genetic test results should not be the sole reason for her to consider bilateral mastectomies.     Due to the family history of melanoma, Jessika's close relatives remains at increased risk for developing melanoma. According to the American Cancer Society, Jesskia's close relatives are encouraged to speak to their primary care providers about having regular skin exams and safe skin practices (i.e. sunscreen, self skin exams, limited sun exposure, etc.).    We discussed that Jessika likely has some increased risk for pancreatic cancer given her father's history, but routine screening is typically not recommended.  Jessika is encouraged to discuss this history with her care providers.      Other population cancer screening options, such as those recommended by the American Cancer Society and the National Comprehensive Cancer Network (NCCN), are also appropriate for Jessika and her family. These screening recommendations may change if there are changes to Jessika's personal and/or family history. Final screening recommendations should be made by each individual's managing physician.     Additional Testing Considerations:  Although Jessika's genetic testing result is inconclusive, other relatives may still carry a harmful gene mutation associated with pancreatic cancer. Genetic counseling is recommended for her siblings to discuss genetic testing options.  If any of these relatives do pursue genetic testing, Jessika is encouraged to contact me so that we may review the impact of their test results on her    Summary:  We do not have an explanation for her personal or family history of cancer. Because of that, it is important that she continue with cancer screening based on her personal and family history as discussed above.    Genetic testing is rapidly advancing, and new cancer susceptibility genes will most likely be identified in the future. Therefore, I encouraged Jessika to contact me annually or if there are changes in her personal or family history. This may change how we assess her cancer risk, screening, and the testing we would offer.    Plan:  1.  I provided Jessika with a copy of her test results today.    2. She plans to follow-up with her oncology team for treatment for her breast cancer and her primary care providers for routine care.  3. She should contact me annually, or sooner if her family history changes.  4. I will contact Jessika if the laboratory informs me that these variants have been reclassified.  This may change screening and testing recommendations for Jessika and her relatives.    If Jessika has any further questions, I encouraged her to contact me at [phone].    Deepika Hernández MS,  [facility] [place]ensed Genetic Counselor  [facility]  [phone]

## 2021-06-30 NOTE — PROGRESS NOTES
Progress Notes by Marge Kemp MD at 2/9/2021  1:00 PM     Author: Marge Kemp MD Service: -- Author Type: Physician    Filed: 2/9/2021  3:57 PM Encounter Date: 2/9/2021 Status: Signed    : Marge Kemp MD (Physician)       [facility] Radiation Oncology Consult Note    Patient: Jessika Hunt  MRN: [id number]  Date of Service: 02/09/2021    Assessment:     1. Malignant neoplasm of upper-outer quadrant of right breast in female, estrogen receptor positive (H)          Impression/Plan:   60 y.o. female S/P bilateral breast augmentation (2014) with right breast IDC grade I. DCIS not identified.  ER pos, IN negative, HER-2 positive. Has bilateral implants for augmentation. History of melanoma.     1. 60 year old female S/P bilateral breast augmentation who recently had her needle biopsy confirming IDC grade I, ER pos, IN neg, HER-2 neg. Ultrasound studies showing tumor small, around 8mm but will not know until final pathology. No obvious lymph node involvement.      2. She has several options available to her at this point. The patient is leaning towards lumpectomy given the small size of her tumor. With her current implant in, we are able to radiate the area. The only concern is fibrosis and cosmetic outcome of implant but this will be minimized by standard fractionation, 5040 cGy over 28 fractions. Scarring/fibrosis may also be managed by implant massage and ROM exercises. A mastectomy is also available to the patient, post adjuvant radiation likely not needed but again this will be pending final pathology. Patient is currently satisfied with her current cosmesis, unsure if she wants current implants removed or replaced. Further details regarding immediate reconstruction or delayed reconstruction will be best answered by plastic surgery, and she plans to see Dr. Blas harris.     3.  Three common misconceptions of radiation were addressed:              1) there is  no transfer of heat, so no burns in traditional sense. Skin erythema from irritation.              2) there is no radioactivity at anytime during or after completion of each treatment.Treatment is just energy passing through target.               3) radiation does not cause immunosuppression.     4. Common side effects to expect are fatigue and skin reaction which typically occur 1-2 weeks after start of radiation therapy.  Symptoms typically worsen throughout treatment, peak 1-2 weeks after completion of radiation, and then typically resolve over the next several weeks. The patient voiced understanding of the information discussed. She will consider her surgical options and return to myself if post adjuvant radiation .      5. Follow up with medical oncology for further questions regarding hormonal therapy.     6. Keep follow up with genetics.       Intent of Therapy: Curative  Patient on concurrent Herceptin No  Adjuvant hormonal therapy: TBD  Chemotherapy: N/A   Intended fractionation schedule:  Standard fractionation.     Breast cancer risk factors:     LMP Dates from Last 4 Encounters:   No data found for LMP       We recommend adjuvant radiation as part of her breast conserving therapy to decrease her chance of local recurrence to the single digits. ROM stretches and skin care were discussed with the patient. She understands that these maneuvers need to continue for 6 months following completion of radiation as skin and muscle fibrosis continue to form for weeks to months following completion of therapy.      Side effects that may occur during or within weeks after radiation therapy      Fatigue and general weakness    Darkening, irritation, itchiness, redness, dryness, erythema, peeling, scabbing, ulceration and contraction of the skin of the breast and chest    Swelling of the breast    Loss of armpit hair    Lung irritation    Decrease in appetite    Side effects that may occur months or years after  radiation therapy      Development of another tumor or cancer    Thickening, telangiectasias (development of spider like blood vessels in the skin) and ulceration of the skin of the breast and chest    Firming, fibrosis (scar tissue), fat necrosis, and distortion of the breast    Poor healing after a trauma or surgery in the irradiated area    Nerve damage resulting in loss of arm strength and sensation    Lung inflammation of fibrosis causing cough, fever and shortness of breath    Swelling of an arm and hand    Decreased range of motion of the right upper extremity which may result in shoulder/rotator cuff injury.      The risks, benefits and alternatives to radiation therapy were outlined with the patient. All questions were answered and a consent was signed.      Subjective:   Jessika Hunt is a 60 y.o. female who is being evaluated via a billable video visit.      How would you like to obtain your AVS? MyChart.  Will anyone else be joining your video visit? No      HPI:  Jessika Hunt is a 60 y.o. female S/P bilateral breast augmentation (2014) with right breast IDC grade I.     The patient had a screening mammogram on 1/20/2021 which unfortunately showed an asymmetry in the right breast at 9 o'clock. Diagnostic mammogram and right breast US confirmed asymmetry, a US guided needle core biopsy was performed on 1/27/2021 and pathology showed IDC grade I, ER pos, MD neg, HER-2 negative.     The patient was contacted via video to discuss radiation therapy. The patient has no complaints today and is doing well. She does have history of melanoma on upper extremity, fully excised, several years ago.       Past Medical History:   Diagnosis Date   ? Fibrocystic breast    ? Osteoporosis      Past Surgical History:   Procedure Laterality Date   ? AUGMENTATION MAMMAPLASTY  2014    silicone   ? ECTOPIC PREGNANCY SURGERY  2001   ? US BREAST CORE BIOPSY RIGHT Right 1/27/2021     Current Outpatient Medications on File  Prior to Visit   Medication Sig Dispense Refill   ? butalbital-aspirin-caffeine (BUTALBITAL-ASPIRIN-CAFFEINE) -40 mg Tab per tablet Take by mouth as needed.     ? FLUoxetine (PROZAC) 20 MG capsule      ? ibuprofen (ADVIL,MOTRIN) 600 MG tablet Take 600 mg by mouth 4 (four) times a day as needed for pain.     ? ondansetron (ZOFRAN-ODT) 4 MG disintegrating tablet DIS ONE T PO Q 6 H PRN     ? RELPAX 40 mg tablet      ? SUMAtriptan (IMITREX) 100 MG tablet Take 100 mg by mouth every 2 (two) hours as needed for migraine.     ? traZODone (DESYREL) 50 MG tablet      ? valACYclovir (VALTREX) 1000 MG tablet TK 2 TS PO BID FOR ONE DAY     ? [DISCONTINUED] tiZANidine (ZANAFLEX) 2 MG tablet        No current facility-administered medications on file prior to visit.      Codeine and Nut - unspecified  Social History     Socioeconomic History   ? Marital status:      Spouse name: Not on file   ? Number of children: Not on file   ? Years of education: Not on file   ? Highest education level: Not on file   Occupational History   ? Not on file   Social Needs   ? Financial resource strain: Not on file   ? Food insecurity     Worry: Not on file     Inability: Not on file   ? Transportation needs     Medical: Not on file     Non-medical: Not on file   Tobacco Use   ? Smoking status: Former Smoker   ? Smokeless tobacco: Never Used   Substance and Sexual Activity   ? Alcohol use: Yes     Comment: 2/week   ? Drug use: Not Currently   ? Sexual activity: Not on file   Lifestyle   ? Physical activity     Days per week: Not on file     Minutes per session: Not on file   ? Stress: Not on file   Relationships   ? Social connections     Talks on phone: Not on file     Gets together: Not on file     Attends Denominational service: Not on file     Active member of club or organization: Not on file     Attends meetings of clubs or organizations: Not on file     Relationship status: Not on file   ? Intimate partner violence     Fear of  current or ex partner: Not on file     Emotionally abused: Not on file     Physically abused: Not on file     Forced sexual activity: Not on file   Other Topics Concern   ? Not on file   Social History Narrative   ? Not on file       ROS:  Reviewed with Jessika Hunt today.    General  General (WDL): All general elements are within defined limits  EENT  ENT (WDL): All ENT elements are within defined limits  Glasses or Contacts: Yes - Chronic (Greater than 3 months)  Respiratory       Respiratory (WDL): All respiratory elements are within defined limits  Cardiovascular  Cardiovascular (WDL): All cardiovascular elements are within defined limits  Endocrine  Endocrine (WDL): All endocrine elements are within defined limits  Gastrointestinal  Gastrointestinal (WDL): All gastrointestinal elements are within defined limits  Musculoskeletal  Musculoskeletal (WDL): All musculoskeletal elements are within defined limits  Integumentary                  Neurological  Neurological (WDL): Exceptions to WDL  Headaches: Yes - Chronic (Greater than 3 months)  Dominant Hand: Right  Psychological/Emotional   Psychological/Emotional (WDL): Exceptions to WDL  Depression: Yes - Recent (Less than 3 months)  Anxiety: Yes - Recent (Less than 3 months)  Hematological/Lymphatic  Hematological/Lymphatic (WDL): All hematological/lymphatic elements are within defined limits  Dermatologic  Dermatologic (WDL): All dermatological elements are within defined limits  Genitourinary/Reproductive  Genitourinary/Reproductive (WDL): All genitourinary/reproductive elements are within defined limits  Reproductive     Pain              Currently in Pain: No/denies(does get frequent stress/tension headaches though)      Objective:        Exam:  There were no vitals filed for this visit.        Recent Labs: No results found for this or any previous visit (from the past 168 hour(s)).    Imaging: Imaging results 6 weeks:Mammo Diagnostic Right    Result Date:  1/27/2021  EXAM: MAMMO DIAGNOSTIC 3D RIGHT, US BREAST RIGHT LIMITED 1-3 QUADRANTS LOCATION: [facility] OUTPATIENT SERVICES  [facility] DATE/TIME: 01/27/2021, 8:40 AM INDICATION: Right breast asymmetry-call back. COMPARISON: 01/20/2021, 10/14/2019, 09/07/2017, 06/03/2016. MAMMOGRAPHIC FINDINGS: Right full-field digital diagnostic mammogram performed. The breasts are extremely dense, which lowers the sensitivity of mammography. Breast tomosynthesis was used in interpretation. On the additional tomographic images, asymmetry  and distortion are again seen in the 9:00 position 2 cm from the nipple. The appearance is suspicious for malignancy. A few scattered benign-appearing round calcifications are again seen in the breast. Subpectoral implant is again noted. ULTRASOUND FINDINGS: Targeted right breast ultrasound was performed by both the ultrasonographer and the radiologist. In the 9:00 position 2 cm from the nipple, there was a hypoechoic mass with angular margins measuring 1 x 0.9 x 0.9 cm. The appearance is suspicious for malignancy and the mass correlates with the mammographic abnormality. The right axilla was examined as well with no evidence of lymphadenopathy.     1.  Mass in the right breast is suspicious for malignancy. Ultrasound-guided core biopsy and surgical consultation are recommended. The biopsy will be performed later today. We are helping the patient with scheduling surgical consultation with the [facility] breast surgeons. ACR BI-RADS Category 5: Highly Suggestive of Malignancy. Ultrasound-guided biopsy of the right breast will be performed today. We are helping the patient with scheduling surgical consultation.     Mammo Screening Bilateral    Result Date: 1/20/2021  BILATERAL FULL FIELD DIGITAL SCREENING MAMMOGRAM WITH TOMOSYNTHESIS Performed on: 1/20/21 Compared to: 10/14/2019 Mammo Screening Bilateral, 09/07/2017 Mammo Screening Bilateral, 06/03/2016 Mammo Screening Bilateral,  03/17/2015 Mammo Screening Bilateral, and 08/12/2013 Mammo Screening Bilateral FINDINGS: Bilateral screening mammogram was performed with the assistance of Computer-Aided Detection and breast tomosynthesis. The breasts are extremely dense, which lowers the sensitivity of mammography. There is an asymmetry in the right breast at the 9 o'clock position at middle depth. There are implants present in the left and right breasts. No suspicious findings of the left breast.      ACR BI-RADS Category 0: Need Additional Imaging Evaluation RECOMMENDATION:  Additional mammographic images and possible ultrasound of the right breast. The results and recommendations of this examination will be communicated to the patient and the imaging center will attempt to schedule follow up with the patient.    Mammo Post Clip Placement Right    Addendum Date: 1/29/2021    Pathology shows invasive ductal carcinoma. This is consistent with imaging findings. A verbal report was given to the patient. Surgical consultation is recommended.    Result Date: 1/27/2021  US Breast Core Biopsy Right Mammo Post Clip Placement Right INDICATION: Right breast mass. PROCEDURE: Informed consent was obtained from the patient. Ultrasound was used to localize the mass at the 9 o'clock position of the right breast, 2 cm from the nipple.  The skin was marked, then prepped and draped in a sterile fashion. 1% lidocaine was used for local anesthesia. Under direct sonographic guidance, a 14-gauge coaxial needle was used to obtain 3 core biopsies.  A biopsy marking clip was then placed.  Digital mammograms performed in a separate room, using separate equipment, to document clip placement. The mammogram showed the clip to be in good position. The patient tolerated this well; there are no immediate complications. IMPRESSION: 1. Ultrasound-guided biopsy of mass in the right breast. Pathology pending. 2. Post procedure mammogram for marker placement    Us Breast Core Biopsy  Right    Addendum Date: 1/29/2021    Pathology shows invasive ductal carcinoma. This is consistent with imaging findings. A verbal report was given to the patient. Surgical consultation is recommended.    Result Date: 1/27/2021  US Breast Core Biopsy Right Mammo Post Clip Placement Right INDICATION: Right breast mass. PROCEDURE: Informed consent was obtained from the patient. Ultrasound was used to localize the mass at the 9 o'clock position of the right breast, 2 cm from the nipple.  The skin was marked, then prepped and draped in a sterile fashion. 1% lidocaine was used for local anesthesia. Under direct sonographic guidance, a 14-gauge coaxial needle was used to obtain 3 core biopsies.  A biopsy marking clip was then placed.  Digital mammograms performed in a separate room, using separate equipment, to document clip placement. The mammogram showed the clip to be in good position. The patient tolerated this well; there are no immediate complications. IMPRESSION: 1. Ultrasound-guided biopsy of mass in the right breast. Pathology pending. 2. Post procedure mammogram for marker placement    Us Breast Right Limited 1-3 Quadrants    Result Date: 1/27/2021  EXAM: MAMMO DIAGNOSTIC 3D RIGHT, US BREAST RIGHT LIMITED 1-3 QUADRANTS LOCATION: [facility] SERVICES  [facility] DATE/TIME: 01/27/2021, 8:40 AM INDICATION: Right breast asymmetry-call back. COMPARISON: 01/20/2021, 10/14/2019, 09/07/2017, 06/03/2016. MAMMOGRAPHIC FINDINGS: Right full-field digital diagnostic mammogram performed. The breasts are extremely dense, which lowers the sensitivity of mammography. Breast tomosynthesis was used in interpretation. On the additional tomographic images, asymmetry  and distortion are again seen in the 9:00 position 2 cm from the nipple. The appearance is suspicious for malignancy. A few scattered benign-appearing round calcifications are again seen in the breast. Subpectoral implant is again noted. ULTRASOUND FINDINGS:  Targeted right breast ultrasound was performed by both the ultrasonographer and the radiologist. In the 9:00 position 2 cm from the nipple, there was a hypoechoic mass with angular margins measuring 1 x 0.9 x 0.9 cm. The appearance is suspicious for malignancy and the mass correlates with the mammographic abnormality. The right axilla was examined as well with no evidence of lymphadenopathy.     1.  Mass in the right breast is suspicious for malignancy. Ultrasound-guided core biopsy and surgical consultation are recommended. The biopsy will be performed later today. We are helping the patient with scheduling surgical consultation with the [facility] breast surgeons. ACR BI-RADS Category 5: Highly Suggestive of Malignancy. Ultrasound-guided biopsy of the right breast will be performed today. We are helping the patient with scheduling surgical consultation.       Pathology:   Results for orders placed or performed during the hospital encounter of 01/27/21 (from the past 8760 hour(s))   Surgical Pathology Exam   Result Value    Case Report      Surgical Pathology                                Case: [id number]                                    Authorizing Provider:  Melody Rubalcava MD           Collected:           01/27/2021 1505              Ordering Location:     [facility]      Received:            01/27/2021 1530                                     [facility]                                                         Pathologist:           Bruce Ricks MD                                                        Specimen:    Breast, Right, 9:00, 2 cm FN                                                               Addendum      ER/AR/HER2 IMMUNOHISTOCHEMICAL STAINS ARE PERFORMED WITH APPROPRIATE POSITIVE CONTROLS    ESTROGEN RECEPTOR: STRONGLY POSITIVE (> 95%; 3+), IN INVASIVE CARCINOMA  PROGESTERONE RECEPTOR: NEGATIVE (0%),  IN INVASIVE CARCINOMA  [id number]: INDETERMINATE (SCORE  2+); PARAFFIN EMBEDDED TISSUE WILL BE SUBMITTED FOR REFLEX      HER-2/SHILOH ANALYSIS BY FISH (SEE SUPPLEMENTAL REPORT)     ADDENDUM COMMENT:  DR. YESI HOLT HAS REVIEWED THE  IMMUNOSTAINS AND CONCURS WITH INTERPRETATION    METHOD FOR EVALUATION:   MANUAL ESTIMATION            FISH HER2/shiloh Report, Addendum      This case was submitted to [employer] for additional testing as requested by Dr.Jaena Ricks. See scanned FISH Analysis HER2 Breast Report Case No. [id number].     Results:   NEGATIVE      Final Diagnosis      BREAST, RIGHT, MASS, 9 O'CLOCK, 2 CM FROM NIPPLE, ULTRASOUND GUIDED NEEDLE CORE BIOPSY:    -  INVASIVE DUCTAL CARCINOMA, CHITO GRADE 1 (TUBULES 2, NUCLEI 2, MITOSES 1) WITH       A FOCAL LOBULAR GROWTH PATTERN    -  GREATEST TUMOR LENGTH, 8 MM    -  DCIS IS NOT IDENTIFIED    -  ER/KY/HER2 IHC STAINS ARE PENDING AND RESULTS WILL BE REPORTED IN AN ADDENDUM    MCRS    Comment      Dr. Yesi Holt has reviewed the case and concurs with the diagnosis.     Red ink is microscopically confirmed.    Total formalin fixation time: 6 hours 55 minutes.    Microscopic Description      Microscopic examination performed, substantiating the above diagnosis.    Clinical Information      Clinical history: Right breast mass  Reason for procedure: Right breast mass    Core Breast Biopsy  Method: Ultrasound   Breast: Right  Site: 9 o'clock, 2 cm fn  Number of Cores: 3  Indication: Mass, 1.0 cm  Pre-test Suspicion: H  Time of Collection: 3:05 pm  Time Placed in Formalin: 3:05 pm  Microcalcifications present on radiograph: N/A  Radiologist: Martha Velazquez MD    Gross Description      Received in formalin, labeled with the patient's name, Jessika Hunt, and right breast core biopsy, 9 o'clock, 2 cm FN, are three cylindrical, white-pink to yellow cores of tissue averaging 0.9 x 0.1 cm. The cores are inked red. TE-1C    NOTE: The specimen is collected and placed into formalin at 1505, 01/27/21. RJR:nino     Special Stains      Note - Estrogen and Progesterone Receptor Immunoperoxidase Stains:  These stains were done using the following criteria:    Specimen fixative: Formalin-fixed paraffin-embedded sections    Detection system: Biotin-free multimer-based technology detection system ([employer])    Retrieval method: CC1 pretreatment; a nelli-based buffer with a slightly basic PH used at an elevated                                       temperature (95+5 degrees C)    Clone:  Estrogen receptor-SP1, Rabbit monoclonal (Chrisney)     Progesterone receptor-1E2, Rabbit monoclonal (Chrisney)        Scoring method (CAP/ASCO guidelines):                                       Intensity of nuclear stain: strong vs weak vs absent                                       Percentage of cells stained:                                         Indeterminate (Internal control cells present; no immunoreactivity of either                                         tumor cells or internal controls)                                          Negative (Percentage of cells with nuclear positivity is less than 1%)                                         Positive (Percentage of cells with nuclear positivity is equal to or greater than 1%)    REFERENCES:  1) Himanshu ME, Iraj LOPEZ, Hannah M, et al. American Society of Clinical Oncology/College of American Pathologists guideline recommendations for immunohistochemical testing of estrogen and progesterone receptors in breast cancer. Arch Pathol Lab Med. 2010;134(6):907-922    Note - HER-2/shiloh Immunoperoxidase Stain:  This stain was done using the following criteria:    Specimen fixative: Formalin-fixed paraffin-embedded sections    Detection system: Biotin-free multimer-based technology detection system (Chrisney)    Retrieval method: CC1 pretreatment; a nelli-based buffer with a slightly basic PH used at an elevated     temperature (95 plus or minus 5 degrees C)    Clone:  4B5, Rabbit monoclonal ([employer]  Pathway)    Scoring method: IHC 3+ - Positive (Circumferential membrane  staining that is complete, intense, and     within greater than 10% of tumor cells)       IHC 2+ - Equivocal (Circumferential Membrane staining that is incomplete and/or     weak/moderate and within greater than 10% of tumor cells or complete and     circumferential membrane staining that is intense and less than or equal to 10% of tumor     cells)       IHC1+ - Negative (Incomplete membrane staining that is faint/barely perceptible     and within greater than 10% of tumor cells       IHC 0 - Negative (No staining is Observed or Membrane staining that is incomplete and     is faint/barely perceptible and within less than or equal to 10% of tumor cells)    REFERENCES:  1) Pérez CHACON et al: Recommendations for Human Epidermal Growth Factor Receptor 2 Testing in Breast Cancer.      American Society of Clinical Oncology/College of American Pathologists Clinical Practice Guideline Update.      Arch Pathol Lab Med. 2014;138: 241-256     * HER-2/shiloh stain performed using the [employer]'s  FDA approved methodology.    Charges      CPT: 34164, 88360 x3  ICD-10: C50.811    cc: Martha Velazquez MD    Result Flag Malignant (!)     Comment: SPECIMEN PROCESSING:    All histology slide preparation and stains; and cytology slide preparation, staining, and cytotechnologist screening done at [facility] are performed at [facility], [address] [place] [address], [facility], with final interpretation, frozen section analysis, and cytology adequacy assessment at indicated laboratory.         Pathology Results        Malignant - Core biopsy, Right - 1/27/2021      Pathology Code Malignancy Type    Invasive ductal carcinoma Invasive          Additional Information            Concordance: Not Entered Estrogen: Positive      Progesterone: Negative    Histology Grade: G1     HER2/shiloh IHC: 2+               Video-Visit  Details    Type of service:  Video Visit    Video Start Time: 1:48 PM  Video End Time (time video stopped): 2:24 PM  Originating Location (pt. Location): Home    Distant Location (provider location):  [facility] RADIATION ONCOLOGY [place]     Platform used for Video Visit: [employer]        60  minutes spent on the date of the encounter doing chart review, review of test results, interpretation of tests, patient visit and documentation       I, Marge Kemp MD personally performed the services described in this documentation, as scribed by Josh Mtz in my presence, and it is both accurate and complete.    Signed by: Marge Kemp MD, MPH

## 2021-07-04 NOTE — ADDENDUM NOTE
Addendum Note by Lombardi, Susan L, RN at 3/24/2021  1:00 PM     Author: Lombardi, Susan L, RN Service: -- Author Type: RN, Care Manager    Filed: 3/24/2021  3:42 PM Date of Service: 3/24/2021  1:00 PM Status: Signed    : Lombardi, Susan L, RN (RN, Care Manager)    Encounter addended by: Lombardi, Susan L, RN on: 3/24/2021  3:42 PM      Actions taken: Clinical Note Signed

## 2021-07-13 ENCOUNTER — RECORDS - HEALTHEAST (OUTPATIENT)
Dept: ADMINISTRATIVE | Facility: CLINIC | Age: 61
End: 2021-07-13

## 2021-07-21 ENCOUNTER — RECORDS - HEALTHEAST (OUTPATIENT)
Dept: ADMINISTRATIVE | Facility: CLINIC | Age: 61
End: 2021-07-21

## 2021-10-02 ENCOUNTER — HEALTH MAINTENANCE LETTER (OUTPATIENT)
Age: 61
End: 2021-10-02

## 2021-10-07 ENCOUNTER — LAB REQUISITION (OUTPATIENT)
Dept: LAB | Facility: CLINIC | Age: 61
End: 2021-10-07

## 2021-10-07 DIAGNOSIS — G25.81 RESTLESS LEGS SYNDROME: ICD-10-CM

## 2021-10-07 DIAGNOSIS — Z13.6 ENCOUNTER FOR SCREENING FOR CARDIOVASCULAR DISORDERS: ICD-10-CM

## 2021-10-07 DIAGNOSIS — M81.0 AGE-RELATED OSTEOPOROSIS WITHOUT CURRENT PATHOLOGICAL FRACTURE: ICD-10-CM

## 2021-10-07 LAB
CHOLEST SERPL-MCNC: 203 MG/DL
ERYTHROCYTE [DISTWIDTH] IN BLOOD BY AUTOMATED COUNT: 13.2 % (ref 10–15)
HCT VFR BLD AUTO: 39.5 % (ref 35–47)
HDLC SERPL-MCNC: 71 MG/DL
HGB BLD-MCNC: 13.3 G/DL (ref 11.7–15.7)
LDLC SERPL CALC-MCNC: 116 MG/DL
MCH RBC QN AUTO: 32 PG (ref 26.5–33)
MCHC RBC AUTO-ENTMCNC: 33.7 G/DL (ref 31.5–36.5)
MCV RBC AUTO: 95 FL (ref 78–100)
PLATELET # BLD AUTO: 219 10E3/UL (ref 150–450)
RBC # BLD AUTO: 4.15 10E6/UL (ref 3.8–5.2)
TRIGL SERPL-MCNC: 81 MG/DL
WBC # BLD AUTO: 4 10E3/UL (ref 4–11)

## 2021-10-07 PROCEDURE — 80061 LIPID PANEL: CPT | Performed by: FAMILY MEDICINE

## 2021-10-07 PROCEDURE — 85014 HEMATOCRIT: CPT | Performed by: FAMILY MEDICINE

## 2021-10-07 PROCEDURE — 82306 VITAMIN D 25 HYDROXY: CPT | Performed by: FAMILY MEDICINE

## 2021-10-08 LAB — DEPRECATED CALCIDIOL+CALCIFEROL SERPL-MC: 82 UG/L (ref 30–80)

## 2022-03-15 NOTE — PROGRESS NOTES
History:  Jessika Hunt presents for 1 year follow-up of breast cancer.  She is s/p bilateral mastectomy with immediate reconstruction on March 11, 2021.  This was followed by endocrine therapy with Dr Santiago.  She had some hot flashes.  She has noticed some skin changes.  Her left hip sometimes bothers her.  She underwent nipple reconstruction and utilizes some areola tattoos that she bought off of [employer].  She plans on getting a real nipple tattoo in the future.    Physical exam:  CHEST: Good symmetry.  Wise closure incisions.  No palpable masses or lymphadenopathy bilaterally.    ASSESSMENT:  Jessika Hunt is s/p bilateral mastectomy for right breast invasive ductal carcinoma    PLAN:  Continue with yearly clinical exams.  I also recommend monthly self exams.    - Will continue with her PCP  Continue medical therapy per Dr. Santiago - tamoxifen  Return to the breast clinic as needed    Karla Darling DO  General Surgeon  [facility]  Breast [address] [address]  Office: [phone]  Employed by - [employer]

## 2022-03-16 ENCOUNTER — OFFICE VISIT (OUTPATIENT)
Dept: SURGERY | Facility: CLINIC | Age: 62
End: 2022-03-16
Attending: FAMILY MEDICINE
Payer: COMMERCIAL

## 2022-03-16 DIAGNOSIS — Z85.3 HX: BREAST CANCER: Primary | ICD-10-CM

## 2022-03-16 PROCEDURE — G0463 HOSPITAL OUTPT CLINIC VISIT: HCPCS

## 2022-03-16 PROCEDURE — 99212 OFFICE O/P EST SF 10 MIN: CPT | Performed by: SURGERY

## 2022-03-16 RX ORDER — TAMOXIFEN CITRATE 20 MG/1
TABLET ORAL
COMMUNITY
Start: 2022-03-04

## 2022-03-16 RX ORDER — IBUPROFEN 200 MG
1 CAPSULE ORAL
COMMUNITY

## 2022-03-16 RX ORDER — VENLAFAXINE HYDROCHLORIDE 37.5 MG/1
CAPSULE, EXTENDED RELEASE ORAL
COMMUNITY
Start: 2022-02-16

## 2022-03-16 NOTE — NURSING NOTE
Jessika presents to [facility] for a follow up appointment with Dr. Darling .Patient notes no new breast concerns.  She is following with Dr. Arias for medical oncology and is taking endocrine therapy, tolerating it well.  RN assessment and EMRupdate.  Patient met with Dr. Darling .  See dictation for details of visit and follow up plan.  Support provided, invited calls.  RN time 12 mins.

## 2022-03-16 NOTE — LETTER
3/16/2022         RE: Jessika Hunt  [address] [place] [address]        Dear Colleague,    Thank you for referring your patient, Jessika Hunt, to the [facility] [place]. Please see a copy of my visit note below.    History:  Jessika Hunt presents for 1 year follow-up of breast cancer.  She is s/p bilateral mastectomy with immediate reconstruction on March 11, 2021.  This was followed by endocrine therapy with Dr Santiago.  She had some hot flashes.  She has noticed some skin changes.  Her left hip sometimes bothers her.  She underwent nipple reconstruction and utilizes some areola tattoos that she bought off of [employer].  She plans on getting a real nipple tattoo in the future.    Physical exam:  CHEST: Good symmetry.  Wise closure incisions.  No palpable masses or lymphadenopathy bilaterally.    ASSESSMENT:  Jessika Hunt is s/p bilateral mastectomy for right breast invasive ductal carcinoma    PLAN:  Continue with yearly clinical exams.  I also recommend monthly self exams.    - Will continue with her PCP  Continue medical therapy per Dr. Santiago - tamoxifen  Return to the breast clinic as needed    Karla Darling DO  General Surgeon  [facility]  Breast [address] [address]  Office: [phone]  Employed by [employer]        Again, thank you for allowing me to participate in the care of your patient.        Sincerely,        Karla Darling DO

## 2022-05-14 ENCOUNTER — HEALTH MAINTENANCE LETTER (OUTPATIENT)
Age: 62
End: 2022-05-14

## 2022-06-30 ENCOUNTER — LAB REQUISITION (OUTPATIENT)
Dept: LAB | Facility: CLINIC | Age: 62
End: 2022-06-30

## 2022-06-30 DIAGNOSIS — R41.3 OTHER AMNESIA: ICD-10-CM

## 2022-06-30 LAB
ALBUMIN SERPL BCG-MCNC: 4.4 G/DL (ref 3.5–5.2)
ALP SERPL-CCNC: 46 U/L (ref 35–104)
ALT SERPL W P-5'-P-CCNC: 21 U/L (ref 10–35)
ANION GAP SERPL CALCULATED.3IONS-SCNC: 8 MMOL/L (ref 7–15)
AST SERPL W P-5'-P-CCNC: 26 U/L (ref 10–35)
BILIRUB SERPL-MCNC: 0.2 MG/DL
BUN SERPL-MCNC: 16.7 MG/DL (ref 8–23)
CALCIUM SERPL-MCNC: 9.4 MG/DL (ref 8.8–10.2)
CHLORIDE SERPL-SCNC: 103 MMOL/L (ref 98–107)
CREAT SERPL-MCNC: 0.82 MG/DL (ref 0.51–0.95)
DEPRECATED HCO3 PLAS-SCNC: 28 MMOL/L (ref 22–29)
ERYTHROCYTE [DISTWIDTH] IN BLOOD BY AUTOMATED COUNT: 13.1 % (ref 10–15)
GFR SERPL CREATININE-BSD FRML MDRD: 81 ML/MIN/1.73M2
GLUCOSE SERPL-MCNC: 70 MG/DL (ref 70–99)
HCT VFR BLD AUTO: 41.5 % (ref 35–47)
HGB BLD-MCNC: 13.5 G/DL (ref 11.7–15.7)
MCH RBC QN AUTO: 31.7 PG (ref 26.5–33)
MCHC RBC AUTO-ENTMCNC: 32.5 G/DL (ref 31.5–36.5)
MCV RBC AUTO: 97 FL (ref 78–100)
PLATELET # BLD AUTO: 240 10E3/UL (ref 150–450)
POTASSIUM SERPL-SCNC: 4.4 MMOL/L (ref 3.4–5.3)
PROT SERPL-MCNC: 6.7 G/DL (ref 6.4–8.3)
RBC # BLD AUTO: 4.26 10E6/UL (ref 3.8–5.2)
SODIUM SERPL-SCNC: 139 MMOL/L (ref 136–145)
T4 FREE SERPL-MCNC: 0.99 NG/DL (ref 0.9–1.7)
TSH SERPL DL<=0.005 MIU/L-ACNC: 1.33 UIU/ML (ref 0.3–4.2)
VIT B12 SERPL-MCNC: 550 PG/ML (ref 232–1245)
WBC # BLD AUTO: 6 10E3/UL (ref 4–11)

## 2022-06-30 PROCEDURE — 82607 VITAMIN B-12: CPT | Performed by: FAMILY MEDICINE

## 2022-06-30 PROCEDURE — 85027 COMPLETE CBC AUTOMATED: CPT | Performed by: FAMILY MEDICINE

## 2022-06-30 PROCEDURE — 80053 COMPREHEN METABOLIC PANEL: CPT | Performed by: FAMILY MEDICINE

## 2022-06-30 PROCEDURE — 82746 ASSAY OF FOLIC ACID SERUM: CPT | Performed by: FAMILY MEDICINE

## 2022-06-30 PROCEDURE — 84443 ASSAY THYROID STIM HORMONE: CPT | Performed by: FAMILY MEDICINE

## 2022-06-30 PROCEDURE — 84439 ASSAY OF FREE THYROXINE: CPT | Performed by: FAMILY MEDICINE

## 2022-07-07 LAB — FOLATE SERPL-MCNC: 18.2 NG/ML (ref 4.6–34.8)

## 2022-09-04 ENCOUNTER — HEALTH MAINTENANCE LETTER (OUTPATIENT)
Age: 62
End: 2022-09-04

## 2023-03-17 ENCOUNTER — TELEPHONE (OUTPATIENT)
Dept: SURGERY | Facility: CLINIC | Age: 63
End: 2023-03-17
Payer: COMMERCIAL

## 2023-03-17 NOTE — TELEPHONE ENCOUNTER
Jessika called.  Said she has been having issues with her arm and shoulder.  Has been working with her ortho provider but it doesn't seem to be getting better. She has been doing some research and is now concerned it could be cancer in her bone. She has not yet talked to Dr Arias about this. Told her to call Dr. Arias's office to talk with her about it and seek her recommendations.  She agrees.

## 2023-04-27 ENCOUNTER — HOSPITAL ENCOUNTER (OUTPATIENT)
Dept: BONE DENSITY | Facility: HOSPITAL | Age: 63
Discharge: HOME | End: 2023-04-27
Attending: INTERNAL MEDICINE | Admitting: INTERNAL MEDICINE
Payer: COMMERCIAL

## 2023-04-27 DIAGNOSIS — C50.011 PAGET'S DISEASE OF THE BREAST, RIGHT (H): ICD-10-CM

## 2023-04-27 PROCEDURE — 77080 DXA BONE DENSITY AXIAL: CPT

## 2023-06-09 ENCOUNTER — ANCILLARY PROCEDURE (OUTPATIENT)
Dept: RADIOLOGY | Facility: CLINIC | Age: 63
End: 2023-06-09
Payer: COMMERCIAL

## 2023-06-12 ENCOUNTER — TELEPHONE (OUTPATIENT)
Dept: SURGERY | Facility: CLINIC | Age: 63
End: 2023-06-12
Payer: COMMERCIAL

## 2023-06-12 NOTE — TELEPHONE ENCOUNTER
"Jessika called, can feel a lump under her skin on her right breast.  Feels like a \"arsalan\".  Had ultrasound done at [facility], was told probably a lymph node.  Patient said Dr. Arias called her and suggested she see Dr. Darling for exam.  Scheduled patient to come see Dr. Darling on Weds, 6-14-23.  Called [facility] for reports and images.    "

## 2023-06-13 NOTE — PROGRESS NOTES
History:  Jessika Hunt presents for 2 year follow-up of breast cancer.  She is s/p bilateral mastectomy with immediate reconstruction ([facility]as) on March 11, 2021.  This was followed by endocrine therapy with Dr Santiago.  She underwent nipple reconstruction.  Since I last saw her she had nipple tattooing.  She feels like her breasts are a bit heavy.  She is considering downsizing.  She recently noticed a pleural underneath the skin on the right side.  It does not cause her any symptoms.    She is leaving for [place] next week.    Physical exam:  BREAST: Good shape and symmetry. T incision.  Right breast 9:00 zone 3 but with underlying implant is a subcentimeter easily palpable round and mobile lesion.  Findings are consistent with fat necrosis.    Imaging:  Pertinent images personally reviewed by myself and discussed with the patient.  Radiology reports:  Exam Date: 06/09/2023  EXAM: BREAST ULTRASOUND LIMITED, UNILATERAL RIGHT    CLINICAL INFORMATION: One day history palpable nodule along lateral right breast implant margin. History of breast carcinoma status post bilateral mastectomy and implant placement.    TECHNICAL INFORMATION: Limited right breast ultrasound.    COMPARISON: None    INTERPRETATION: Ultrasound evaluation right breast demonstrates 6 x 7 x 3 mm lymph node, located along the implant margin O'clock position 8 cm from nipple. No atypical ultrasound features. No additional abnormality. No implant abnormality is identified.    CONCLUSION:  7 x 6 x 3 mm lymph node along the right breast implant margin    O'clock position 8 cm from nipple corresponding to palpable abnormality. Finding represents probable benign finding without atypical ultrasound features of lymph node. One month follow-up ultrasound to reevaluate this finding is recommended.    The above findings and recommendations were discussed in great detail with the patient at the time of imaging including recommendations to return for reassessment  if there are clinical changes that are worrisome. A layman's letter will also be sent to the patient communicating results and recommendations.    BI-RADS 3. Probably benign.    ASSESSMENT:  Jessika Hunt is s/p bilateral mastectomy for history of right-sided invasive ductal carcinoma    - Grade II, T1, N0, ER/MS+, oncotype 23  --> prognostic stage IA  New palpable subcutaneous mass.  I am more suspicious for fat necrosis as opposed to a lymph node.    PLAN:  Most recent imaging reviewed by myself.  Reassurance was provided.  I would like for her to obtain repeat ultrasound in about 3 months.  We will have this done at the breast center.    Karla Darling DO  General Surgeon  [facility]  Breast [address] [address]  Office: [phone]  Employed by - [facility]

## 2023-06-14 ENCOUNTER — OFFICE VISIT (OUTPATIENT)
Dept: SURGERY | Facility: CLINIC | Age: 63
End: 2023-06-14
Attending: SURGERY
Payer: COMMERCIAL

## 2023-06-14 DIAGNOSIS — N63.11 MASS OF UPPER OUTER QUADRANT OF RIGHT BREAST: Primary | ICD-10-CM

## 2023-06-14 PROCEDURE — G0463 HOSPITAL OUTPT CLINIC VISIT: HCPCS | Performed by: SURGERY

## 2023-06-14 PROCEDURE — 99212 OFFICE O/P EST SF 10 MIN: CPT | Performed by: SURGERY

## 2023-06-14 NOTE — PATIENT INSTRUCTIONS
Call Radiology Scheduling at [phone] to set up your ultrasound, due in 3 mos.    Call with any questions!    Thank you,  Haley RN:  [phone]

## 2023-06-14 NOTE — NURSING NOTE
"Jessika presents to [facility] [place] today for a surgical consult with Dr. Darling  regarding a right breast \"lump\". Patient has a hx of right breast cancer, treated with mastectomy and reconstruction.  She did have imaging done at [facility]. Dr. Arias suggested she come see Dr. Darling.   RN assessment and EMR update.  Patient met with Dr. Darling .  See dictation for details of visit. She will plan follow up ultrasound in 3 mos, Dr. Darling suggested she come here for that imaging.  Support provided.  RN time 15 mins.  "

## 2023-07-22 ENCOUNTER — HEALTH MAINTENANCE LETTER (OUTPATIENT)
Age: 63
End: 2023-07-22

## 2023-08-24 ENCOUNTER — ANCILLARY PROCEDURE (OUTPATIENT)
Dept: MAMMOGRAPHY | Facility: CLINIC | Age: 63
End: 2023-08-24
Attending: SURGERY
Payer: COMMERCIAL

## 2023-08-24 DIAGNOSIS — N63.11 MASS OF UPPER OUTER QUADRANT OF RIGHT BREAST: ICD-10-CM

## 2023-08-24 PROCEDURE — 76642 ULTRASOUND BREAST LIMITED: CPT | Mod: RT

## 2023-12-20 NOTE — LETTER
6/14/2023         RE: Jessika Hunt  [address]   [place] [address]        Dear Colleague,    Thank you for referring your patient, Jessika Hunt, to the [facility] [place]. Please see a copy of my visit note below.    History:  Jessika Hunt presents for 2 year follow-up of breast cancer.  She is s/p bilateral mastectomy with immediate reconstruction (Blas) on March 11, 2021.  This was followed by endocrine therapy with Dr Santiago.  She underwent nipple reconstruction.  Since I last saw her she had nipple tattooing.  She feels like her breasts are a bit heavy.  She is considering downsizing.  She recently noticed a pleural underneath the skin on the right side.  It does not cause her any symptoms.    She is leaving for [place] next week.    Physical exam:  BREAST: Good shape and symmetry. T incision.  Right breast 9:00 zone 3 but with underlying implant is a subcentimeter easily palpable round and mobile lesion.  Findings are consistent with fat necrosis.    Imaging:  Pertinent images personally reviewed by myself and discussed with the patient.  Radiology reports:  Exam Date: 06/09/2023  EXAM: BREAST ULTRASOUND LIMITED, UNILATERAL RIGHT    CLINICAL INFORMATION: One day history palpable nodule along lateral right breast implant margin. History of breast carcinoma status post bilateral mastectomy and implant placement.    TECHNICAL INFORMATION: Limited right breast ultrasound.    COMPARISON: None    INTERPRETATION: Ultrasound evaluation right breast demonstrates 6 x 7 x 3 mm lymph node, located along the implant margin O'clock position 8 cm from nipple. No atypical ultrasound features. No additional abnormality. No implant abnormality is identified.    CONCLUSION:  7 x 6 x 3 mm lymph node along the right breast implant margin    O'clock position 8 cm from nipple corresponding to palpable abnormality. Finding represents probable benign finding without atypical ultrasound  MSSA bacteremia features of lymph node. One month follow-up ultrasound to reevaluate this finding is recommended.    The above findings and recommendations were discussed in great detail with the patient at the time of imaging including recommendations to return for reassessment if there are clinical changes that are worrisome. A layman's letter will also be sent to the patient communicating results and recommendations.    BI-RADS 3. Probably benign.    ASSESSMENT:  Jessika Hunt is s/p bilateral mastectomy for history of right-sided invasive ductal carcinoma    - Grade II, T1, N0, ER/WA+, oncotype 23  --> prognostic stage IA  New palpable subcutaneous mass.  I am more suspicious for fat necrosis as opposed to a lymph node.    PLAN:  Most recent imaging reviewed by myself.  Reassurance was provided.  I would like for her to obtain repeat ultrasound in about 3 months.  We will have this done at the breast center.    Karla Darling DO  General Surgeon  [facility]  Breast [address] [address]  Office: [phone]  Employed by - [facility]        Again, thank you for allowing me to participate in the care of your patient.        Sincerely,        Karla Darling DO

## 2024-07-11 ENCOUNTER — LAB REQUISITION (OUTPATIENT)
Dept: LAB | Facility: CLINIC | Age: 64
End: 2024-07-11

## 2024-07-11 DIAGNOSIS — M81.0 AGE-RELATED OSTEOPOROSIS WITHOUT CURRENT PATHOLOGICAL FRACTURE: ICD-10-CM

## 2024-07-11 DIAGNOSIS — G25.81 RESTLESS LEGS SYNDROME: ICD-10-CM

## 2024-07-11 DIAGNOSIS — R20.0 ANESTHESIA OF SKIN: ICD-10-CM

## 2024-07-11 DIAGNOSIS — C50.911 MALIGNANT NEOPLASM OF UNSPECIFIED SITE OF RIGHT FEMALE BREAST (H): ICD-10-CM

## 2024-07-11 LAB
ERYTHROCYTE [DISTWIDTH] IN BLOOD BY AUTOMATED COUNT: 13.5 % (ref 10–15)
FOLATE SERPL-MCNC: 19.3 NG/ML (ref 4.6–34.8)
HCT VFR BLD AUTO: 42.4 % (ref 35–47)
HGB BLD-MCNC: 14.1 G/DL (ref 11.7–15.7)
MCH RBC QN AUTO: 31.4 PG (ref 26.5–33)
MCHC RBC AUTO-ENTMCNC: 33.3 G/DL (ref 31.5–36.5)
MCV RBC AUTO: 94 FL (ref 78–100)
PLATELET # BLD AUTO: 265 10E3/UL (ref 150–450)
RBC # BLD AUTO: 4.49 10E6/UL (ref 3.8–5.2)
WBC # BLD AUTO: 7.5 10E3/UL (ref 4–11)

## 2024-07-11 PROCEDURE — 86376 MICROSOMAL ANTIBODY EACH: CPT | Performed by: FAMILY MEDICINE

## 2024-07-11 PROCEDURE — 86140 C-REACTIVE PROTEIN: CPT | Performed by: FAMILY MEDICINE

## 2024-07-11 PROCEDURE — 83540 ASSAY OF IRON: CPT | Performed by: FAMILY MEDICINE

## 2024-07-11 PROCEDURE — 84439 ASSAY OF FREE THYROXINE: CPT | Performed by: FAMILY MEDICINE

## 2024-07-11 PROCEDURE — 84443 ASSAY THYROID STIM HORMONE: CPT | Performed by: FAMILY MEDICINE

## 2024-07-11 PROCEDURE — 85027 COMPLETE CBC AUTOMATED: CPT | Performed by: FAMILY MEDICINE

## 2024-07-11 PROCEDURE — 82607 VITAMIN B-12: CPT | Performed by: FAMILY MEDICINE

## 2024-07-11 PROCEDURE — 80053 COMPREHEN METABOLIC PANEL: CPT | Performed by: FAMILY MEDICINE

## 2024-07-11 PROCEDURE — 82746 ASSAY OF FOLIC ACID SERUM: CPT | Performed by: FAMILY MEDICINE

## 2024-07-11 PROCEDURE — 82306 VITAMIN D 25 HYDROXY: CPT | Performed by: FAMILY MEDICINE

## 2024-07-11 PROCEDURE — 84481 FREE ASSAY (FT-3): CPT | Performed by: FAMILY MEDICINE

## 2024-07-12 LAB
ALBUMIN SERPL BCG-MCNC: 4.4 G/DL (ref 3.5–5.2)
ALP SERPL-CCNC: 49 U/L (ref 40–150)
ALT SERPL W P-5'-P-CCNC: 26 U/L (ref 0–50)
ANION GAP SERPL CALCULATED.3IONS-SCNC: 13 MMOL/L (ref 7–15)
AST SERPL W P-5'-P-CCNC: 25 U/L (ref 0–45)
BILIRUB SERPL-MCNC: 0.2 MG/DL
BUN SERPL-MCNC: 15.5 MG/DL (ref 8–23)
CALCIUM SERPL-MCNC: 9.5 MG/DL (ref 8.8–10.2)
CHLORIDE SERPL-SCNC: 102 MMOL/L (ref 98–107)
CREAT SERPL-MCNC: 0.78 MG/DL (ref 0.51–0.95)
CRP SERPL-MCNC: <3 MG/L
DEPRECATED HCO3 PLAS-SCNC: 23 MMOL/L (ref 22–29)
EGFRCR SERPLBLD CKD-EPI 2021: 85 ML/MIN/1.73M2
GLUCOSE SERPL-MCNC: 90 MG/DL (ref 70–99)
IRON SERPL-MCNC: 105 UG/DL (ref 37–145)
POTASSIUM SERPL-SCNC: 4.1 MMOL/L (ref 3.4–5.3)
PROT SERPL-MCNC: 7.2 G/DL (ref 6.4–8.3)
SODIUM SERPL-SCNC: 138 MMOL/L (ref 135–145)
T3FREE SERPL-MCNC: 2.6 PG/ML (ref 2–4.4)
T4 FREE SERPL-MCNC: 1.11 NG/DL (ref 0.9–1.7)
THYROPEROXIDASE AB SERPL-ACNC: <10 IU/ML
TSH SERPL DL<=0.005 MIU/L-ACNC: 1.4 UIU/ML (ref 0.3–4.2)
VIT B12 SERPL-MCNC: 1106 PG/ML (ref 232–1245)
VIT D+METAB SERPL-MCNC: 52 NG/ML (ref 20–50)

## 2024-07-16 ENCOUNTER — LAB REQUISITION (OUTPATIENT)
Dept: LAB | Facility: CLINIC | Age: 64
End: 2024-07-16

## 2024-07-16 DIAGNOSIS — G25.81 RESTLESS LEGS SYNDROME: ICD-10-CM

## 2024-07-16 DIAGNOSIS — Z13.6 ENCOUNTER FOR SCREENING FOR CARDIOVASCULAR DISORDERS: ICD-10-CM

## 2024-07-16 LAB
CHOLEST SERPL-MCNC: 213 MG/DL
FASTING STATUS PATIENT QL REPORTED: ABNORMAL
HDLC SERPL-MCNC: 76 MG/DL
INSULIN SERPL-ACNC: 3.2 UU/ML (ref 2.6–24.9)
LDLC SERPL CALC-MCNC: 124 MG/DL
NONHDLC SERPL-MCNC: 137 MG/DL
TRIGL SERPL-MCNC: 65 MG/DL

## 2024-07-16 PROCEDURE — 83525 ASSAY OF INSULIN: CPT | Performed by: FAMILY MEDICINE

## 2024-07-16 PROCEDURE — 80061 LIPID PANEL: CPT | Performed by: FAMILY MEDICINE

## 2024-09-14 ENCOUNTER — HEALTH MAINTENANCE LETTER (OUTPATIENT)
Age: 64
End: 2024-09-14

## 2024-10-03 ENCOUNTER — TELEPHONE (OUTPATIENT)
Dept: SURGERY | Facility: CLINIC | Age: 64
End: 2024-10-03
Payer: COMMERCIAL

## 2024-10-03 NOTE — TELEPHONE ENCOUNTER
Jessika called, said she has a right breast lump that was followed back in 2023.  She is still feeling anxious about this and wondered about having another ultrasound and seeing Dr. Darling.  Patient states the lump is not really feeling larger than it was. Made her an appointment to see Dr. Darling on 10-14-24.

## 2024-10-30 ENCOUNTER — OFFICE VISIT (OUTPATIENT)
Dept: SURGERY | Facility: CLINIC | Age: 64
End: 2024-10-30
Attending: FAMILY MEDICINE
Payer: COMMERCIAL

## 2024-10-30 DIAGNOSIS — R22.9 SUBCUTANEOUS MASS: Primary | ICD-10-CM

## 2024-10-30 PROCEDURE — G0463 HOSPITAL OUTPT CLINIC VISIT: HCPCS | Performed by: SURGERY

## 2024-10-30 PROCEDURE — 99212 OFFICE O/P EST SF 10 MIN: CPT | Performed by: SURGERY

## 2024-10-30 NOTE — NURSING NOTE
Jessika presents to [facility] for a follow up appointment with Dr. Darling.  She is following with Dr. Arias  for medical oncology and is taking endocrine therapy, tolerating it well.  RN assessment and EMRupdate.  Patient met with Dr. Darling .  See dictation for details of visit and follow up plan.  Support provided, invited calls.

## 2024-10-30 NOTE — PROGRESS NOTES
History:  Jessika Hunt is a 64 year old female who presents for follow-up of a subcutaneous nodule on her chest.  She underwent bilateral mastectomy with right sentinel lymph node biopsy and reconstruction in March 2021.  In 2023 a subcutaneous mass was palpated within the reconstructed right breast.  She had 2 ultrasounds last year and they demonstrated shrinkage of the mass.  The patient states that the mass has remained stable but continues to be palpable.    Physical exam:  BREAST: Good shape and symmetry between the 2 reconstructed breasts.  Right breast 9:00 zone 3 is a superficial subcutaneous and firm mass measuring 4 to 5 mm.  This is palpably stable compared to last year.    ASSESSMENT:  Jessika Hunt is a 64 year old female with a subcutaneous mass on the reconstructed right breast.  This is either a stable lymph node or fat necrosis.    PLAN:  Reassurance was provided to the patient.  This mass remains clinically stable.  I do not recommend any continued imaging without a change in symptoms.  She will continue with her endocrine therapy and living a healthy lifestyle.  If she has any further changes or if the mass appears to be growing, we can obtain a follow-up ultrasound.    Karla Darling DO  General Surgeon  [facility]  Breast Center [facility]  [address] [address]  Office: [phone]  Employed by - [facility] [employer]

## 2024-10-30 NOTE — LETTER
10/30/2024      Jessika Hunt  [address]   [place] [address]      Dear Colleague,    Thank you for referring your patient, Jessika Hunt, to the [facility] [place]. Please see a copy of my visit note below.    History:  Jessika Hunt is a 64 year old female who presents for follow-up of a subcutaneous nodule on her chest.  She underwent bilateral mastectomy with right sentinel lymph node biopsy and reconstruction in March 2021.  In 2023 a subcutaneous mass was palpated within the reconstructed right breast.  She had 2 ultrasounds last year and they demonstrated shrinkage of the mass.  The patient states that the mass has remained stable but continues to be palpable.    Physical exam:  BREAST: Good shape and symmetry between the 2 reconstructed breasts.  Right breast 9:00 zone 3 is a superficial subcutaneous and firm mass measuring 4 to 5 mm.  This is palpably stable compared to last year.    ASSESSMENT:  Jessika Hunt is a 64 year old female with a subcutaneous mass on the reconstructed right breast.  This is either a stable lymph node or fat necrosis.    PLAN:  Reassurance was provided to the patient.  This mass remains clinically stable.  I do not recommend any continued imaging without a change in symptoms.  She will continue with her endocrine therapy and living a healthy lifestyle.  If she has any further changes or if the mass appears to be growing, we can obtain a follow-up ultrasound.    Karla Darling DO  General Surgeon  [facility]  Breast Center - [address] [address]  Office: [phone]  Employed by [employer]      Again, thank you for allowing me to participate in the care of your patient.        Sincerely,        Karla Darling DO

## 2025-01-15 ENCOUNTER — TELEPHONE (OUTPATIENT)
Dept: SURGERY | Facility: CLINIC | Age: 65
End: 2025-01-15
Payer: COMMERCIAL

## 2025-01-15 NOTE — TELEPHONE ENCOUNTER
Patient called, she is thinking of finding a new oncologist. Asked for recommendations from Dr. Darling.  Per Dr. Darling, she recommends Dr. Martinez or Dr. Nguyen at .  Patient asked I send this information in an email.  Messaged her their names. Told her if she decides to see one of them to let me know and I can send in a referral.    Also gave her the numbers to HIM and the Film room if she goes outside the system.    Support provided, invited calls.

## 2025-02-27 ENCOUNTER — PATIENT OUTREACH (OUTPATIENT)
Dept: CARE COORDINATION | Facility: CLINIC | Age: 65
End: 2025-02-27
Payer: COMMERCIAL

## 2025-05-05 ENCOUNTER — HOSPITAL ENCOUNTER (OUTPATIENT)
Dept: BONE DENSITY | Facility: HOSPITAL | Age: 65
Discharge: HOME | End: 2025-05-05
Attending: INTERNAL MEDICINE | Admitting: INTERNAL MEDICINE
Payer: COMMERCIAL

## 2025-05-05 DIAGNOSIS — C50.011 MALIGNANT NEOPLASM OF NIPPLE AND AREOLA OF FEMALE BREAST, RIGHT (H): ICD-10-CM

## 2025-05-05 PROCEDURE — 77080 DXA BONE DENSITY AXIAL: CPT
